# Patient Record
Sex: FEMALE | Race: WHITE | NOT HISPANIC OR LATINO | Employment: OTHER | URBAN - METROPOLITAN AREA
[De-identification: names, ages, dates, MRNs, and addresses within clinical notes are randomized per-mention and may not be internally consistent; named-entity substitution may affect disease eponyms.]

---

## 2019-07-09 ENCOUNTER — OFFICE VISIT (OUTPATIENT)
Dept: ORTHOPEDIC SURGERY | Facility: CLINIC | Age: 74
End: 2019-07-09

## 2019-07-09 VITALS
WEIGHT: 200 LBS | SYSTOLIC BLOOD PRESSURE: 139 MMHG | HEIGHT: 61 IN | BODY MASS INDEX: 37.76 KG/M2 | DIASTOLIC BLOOD PRESSURE: 83 MMHG | HEART RATE: 75 BPM

## 2019-07-09 DIAGNOSIS — R52 PAIN: Primary | ICD-10-CM

## 2019-07-09 DIAGNOSIS — M25.561 MECHANICAL KNEE PAIN, RIGHT: ICD-10-CM

## 2019-07-09 DIAGNOSIS — M84.361A STRESS FRACTURE OF RIGHT TIBIA, INITIAL ENCOUNTER: ICD-10-CM

## 2019-07-09 PROCEDURE — 99203 OFFICE O/P NEW LOW 30 MIN: CPT | Performed by: ORTHOPAEDIC SURGERY

## 2019-07-09 RX ORDER — ATORVASTATIN CALCIUM 20 MG/1
20 TABLET, FILM COATED ORAL NIGHTLY
COMMUNITY

## 2019-07-09 RX ORDER — GABAPENTIN 300 MG/1
300 CAPSULE ORAL 2 TIMES DAILY
Status: ON HOLD | COMMUNITY
End: 2020-12-31

## 2019-07-09 RX ORDER — BISOPROLOL FUMARATE 5 MG/1
5 TABLET, FILM COATED ORAL NIGHTLY
Refills: 3 | COMMUNITY
Start: 2019-04-09

## 2019-07-09 RX ORDER — GLIMEPIRIDE 4 MG/1
4 TABLET ORAL 2 TIMES DAILY
Refills: 3 | COMMUNITY
Start: 2019-06-27 | End: 2020-12-03

## 2019-07-09 RX ORDER — PHENAZOPYRIDINE HYDROCHLORIDE 100 MG/1
100 TABLET, FILM COATED ORAL 3 TIMES DAILY PRN
Refills: 0 | COMMUNITY
Start: 2019-05-02 | End: 2022-12-19

## 2019-07-09 RX ORDER — BUPROPION HYDROCHLORIDE 150 MG/1
150 TABLET ORAL DAILY PRN
Refills: 2 | COMMUNITY
Start: 2019-06-17 | End: 2020-12-03

## 2019-07-09 RX ORDER — LEVOTHYROXINE SODIUM 0.05 MG/1
50 TABLET ORAL EVERY MORNING
Refills: 2 | COMMUNITY
Start: 2019-06-20

## 2019-07-09 NOTE — PROGRESS NOTES
Subjective:     Patient ID: PATSY Harley is a 73 y.o. female.    Chief Complaint: right knee pain, new patient to provider    History of Present Illness  PATSY Harley presents to clinic today for evaluation of right knee. She state she fell on  of this year and fell onto her right knee. She was seen by her PCP who took x-rays and stated there were no fractures. Her pain continued to worsen and she went into the emergency room for further evaluation. She was given hydrocodone but she states she does not like them. She had a cortisone injection in her right knee approximately one month ago and she experienced approximately 50% relief of her pain but it only lasted about one week. Today, she rates the pain as 5/10, describes it as aching and shooting in nature.  Localizes pain to medial anterior aspects and the proximal leg. She notes intermittent popping sensations. Has noted improvement with activity modification tylenol.  Symptoms are exacerbated with prolonged weightbearing. Denies prior injury. She notes some intermittent radiation of her pain down her into her right lower leg. She denies associated numbness or tingling.       Social History     Occupational History   • Not on file   Tobacco Use   • Smoking status: Former Smoker     Last attempt to quit: 1980     Years since quittin.5   Substance and Sexual Activity   • Alcohol use: No     Frequency: Never   • Drug use: Not on file   • Sexual activity: Not on file      Past Medical History:   Diagnosis Date   • Anxiety    • Asthma    • Diabetes (CMS/HCC)    • Fibromyalgia    • Fracture, radius    • GERD (gastroesophageal reflux disease)    • Hyperthyroidism    • Sleep apnea      Past Surgical History:   Procedure Laterality Date   • APPENDECTOMY     • CARPAL TUNNEL RELEASE     • CERVICAL DISCECTOMY ANTERIOR     • HYSTERECTOMY     • MASTOIDECTOMY         Family History   Problem Relation Age of Onset   • Cancer Mother    • Cancer  "Father    • Diabetes Sister    • Cancer Sister          Review of Systems   Constitutional: Negative for chills, diaphoresis, fever and unexpected weight change.   HENT: Positive for tinnitus. Negative for hearing loss, nosebleeds and sore throat.    Eyes: Negative for pain and visual disturbance.   Respiratory: Positive for shortness of breath and wheezing. Negative for cough.    Cardiovascular: Negative for chest pain and palpitations.   Gastrointestinal: Negative for abdominal pain, diarrhea, nausea and vomiting.   Endocrine: Negative for cold intolerance, heat intolerance and polydipsia.   Genitourinary: Negative for difficulty urinating, dysuria and hematuria.   Musculoskeletal: Positive for arthralgias. Negative for joint swelling and myalgias.   Skin: Negative for rash and wound.   Allergic/Immunologic: Negative for environmental allergies.   Neurological: Negative for dizziness, syncope and numbness.   Hematological: Does not bruise/bleed easily.   Psychiatric/Behavioral: Negative for dysphoric mood and sleep disturbance. The patient is not nervous/anxious.    All other systems reviewed and are negative.          Objective:  Vitals:    07/09/19 1407   BP: 139/83   Pulse: 75   Weight: 90.7 kg (200 lb)   Height: 154.9 cm (61\")         07/09/19  1407   Weight: 90.7 kg (200 lb)     Body mass index is 37.79 kg/m².     General: No acute distress.  Resp: normal respiratory effort  Skin: no rashes or wounds; normal turgor  Psych: mood and affect appropriate; recent and remote memory intact      Ortho Exam       Right Knee-  ROM 0-95 degrees,   4/5 strength on flexion and extension  Stable to varus and valgus stress at 0 and 30  Positive APC  Maximal tenderness along medial joint line  Positive Ananth's exam  Negative log roll and Stinchfield's    Imaging:    Right Knee X-Ray from outside facility  Indication: Pain    AP, Lateral, and Doolittle views    Findings:  No fracture  No bony lesion  Normal soft " tissues  Normal joint spaces  Mild medial compartment joint space narrowing-these are non weightbearing views    No prior studies were available for comparison.    Standing AP bilateral knee x-ray from today's visit ordered and reviewed by me indicate moderate medial compartment joint space narrowing with bone on bone articulation. Overall varus alignment    Assessment:        1. Pain    2. Mechanical knee pain, right    3. Stress fracture of right tibia, initial encounter           Plan:          1. Discussed treatment options at length with patient at today's visit.   2. Ordered MRI today to evaluate for stress fracture or meniscal tear of the right knee.  3. Will follow-up with the results of the MRI      PATSY Harley was in agreement with plan and had all questions answered.     Orders:  Orders Placed This Encounter   Procedures   • XR Knee 1 or 2 View Bilateral   • MRI Knee Right Without Contrast       Medications:  No orders of the defined types were placed in this encounter.      Followup:  Return for review of MRI results.    PATSY was seen today for pain.    Diagnoses and all orders for this visit:    Pain  -     XR Knee 1 or 2 View Bilateral    Mechanical knee pain, right  -     MRI Knee Right Without Contrast; Future    Stress fracture of right tibia, initial encounter  -     MRI Knee Right Without Contrast; Future        By signing my name here, I Radha Garcia, attest that all documentation on 07/09/19 at 4:33 PM has been prepared under the direction and in the presence of Dr. Sj Hannah.    I, Dr. Sj Hannah, personally performed the services described in this documentation, as scribed by Radha Garcia, in my presence, and it is both accurate and complete.    Dictated utilizing Dragon dictation

## 2019-07-19 ENCOUNTER — HOSPITAL ENCOUNTER (OUTPATIENT)
Dept: MRI IMAGING | Facility: HOSPITAL | Age: 74
Discharge: HOME | End: 2019-07-19
Admitting: ORTHOPAEDIC SURGERY

## 2019-07-19 DIAGNOSIS — M25.561 MECHANICAL KNEE PAIN, RIGHT: ICD-10-CM

## 2019-07-19 DIAGNOSIS — M84.361A STRESS FRACTURE OF RIGHT TIBIA, INITIAL ENCOUNTER: ICD-10-CM

## 2019-07-19 PROCEDURE — 73721 MRI JNT OF LWR EXTRE W/O DYE: CPT

## 2019-07-22 ENCOUNTER — OFFICE VISIT (OUTPATIENT)
Dept: ORTHOPEDIC SURGERY | Facility: CLINIC | Age: 74
End: 2019-07-22

## 2019-07-22 DIAGNOSIS — S83.241A OTHER TEAR OF MEDIAL MENISCUS OF RIGHT KNEE AS CURRENT INJURY, INITIAL ENCOUNTER: ICD-10-CM

## 2019-07-22 DIAGNOSIS — M17.11 PRIMARY OSTEOARTHRITIS OF RIGHT KNEE: Primary | ICD-10-CM

## 2019-07-22 DIAGNOSIS — M84.361A STRESS FRACTURE OF RIGHT TIBIA, INITIAL ENCOUNTER: ICD-10-CM

## 2019-07-22 PROCEDURE — 99214 OFFICE O/P EST MOD 30 MIN: CPT | Performed by: ORTHOPAEDIC SURGERY

## 2019-07-22 PROCEDURE — 20610 DRAIN/INJ JOINT/BURSA W/O US: CPT | Performed by: ORTHOPAEDIC SURGERY

## 2019-07-22 RX ADMIN — TRIAMCINOLONE ACETONIDE 80 MG: 40 INJECTION, SUSPENSION INTRA-ARTICULAR; INTRAMUSCULAR at 15:09

## 2019-07-22 RX ADMIN — LIDOCAINE HYDROCHLORIDE 8 ML: 10 INJECTION, SOLUTION EPIDURAL; INFILTRATION; INTRACAUDAL; PERINEURAL at 15:09

## 2019-07-22 NOTE — PROGRESS NOTES
Subjective:     Patient ID: Jaz Harley is a 73 y.o. female.    Chief Complaint:  Follow-up right knee pain  History of Present Illness  Jaz Harley returns to clinic today for evaluation of right knee, patient states she is had minimal improvement over the last several weeks in regards to her pain, she is still having some catching sensation along the medial aspect of her knee, continues to rate pain is a 7 8 out of 10, aching in nature, occasional sharp pain particular with rotational activities, exacerbated with prolonged weightbearing and deep flexion activities.  Minimal improvement with rest and anti-inflammatory medications.  Swelling does wax and wane.  Denies radiation of pain, denies associated numbness or tingling.     Social History     Occupational History   • Not on file   Tobacco Use   • Smoking status: Former Smoker     Last attempt to quit: 1980     Years since quittin.5   Substance and Sexual Activity   • Alcohol use: No     Frequency: Never   • Drug use: Not on file   • Sexual activity: Not on file      Past Medical History:   Diagnosis Date   • Anxiety    • Asthma    • Diabetes (CMS/HCC)    • Fibromyalgia    • Fracture, radius    • GERD (gastroesophageal reflux disease)    • Hyperthyroidism    • Sleep apnea      Past Surgical History:   Procedure Laterality Date   • APPENDECTOMY     • CARPAL TUNNEL RELEASE     • CERVICAL DISCECTOMY ANTERIOR     • HYSTERECTOMY     • MASTOIDECTOMY         Family History   Problem Relation Age of Onset   • Cancer Mother    • Cancer Father    • Diabetes Sister    • Cancer Sister          Review of Systems   Constitutional: Negative for chills, diaphoresis, fever and unexpected weight change.   HENT: Negative for hearing loss, nosebleeds, sore throat and tinnitus.    Eyes: Negative for pain and visual disturbance.   Respiratory: Negative for cough, shortness of breath and wheezing.    Cardiovascular: Negative for chest pain and palpitations.    Gastrointestinal: Negative for abdominal pain, diarrhea, nausea and vomiting.   Endocrine: Negative for cold intolerance, heat intolerance and polydipsia.   Genitourinary: Negative for difficulty urinating, dysuria and hematuria.   Musculoskeletal: Positive for arthralgias and myalgias. Negative for joint swelling.   Skin: Negative for rash and wound.   Allergic/Immunologic: Negative for environmental allergies and immunocompromised state.   Neurological: Negative for dizziness, syncope and numbness.   Hematological: Does not bruise/bleed easily.   Psychiatric/Behavioral: Negative for dysphoric mood and sleep disturbance. The patient is not nervous/anxious.            Objective:  There were no vitals filed for this visit.  There were no vitals filed for this visit.  There is no height or weight on file to calculate BMI.  General: No acute distress.  Resp: normal respiratory effort  Skin: no rashes or wounds; normal turgor  Psych: mood and affect appropriate; recent and remote memory intact          Ortho Exam     Right knee-maximal tenderness palpation over medial proximal tibia as well as medial joint line with positive Ananth exam, range of motion 0 to 120 degrees, 4-5 strength in flexion and extension, mild effusion noted, stable to varus and valgus stress at 0 and 30 degrees.  Moderately positive active patellar compression test.  Grade 1A Lachman, negative anterior posterior drawer.    Imaging:  Mri Knee Right Without Contrast    Result Date: 7/19/2019  Impression: Degenerative changes in all 3 compartments with cartilage loss most prominent at the medial patellar facet and the medial compartment. There is subchondral edema in the medial tibial plateau with no loose osteochondral fragments. There is a horizontal undersurface tear in the posterior horn the medial meniscus. There is evidence of previous injury to the lateral collateral ligament at its femoral origin without acute tear. Signer Name: Bao  MD Maverick  Signed: 7/19/2019 1:24 PM  Workstation Name: [id number]     Review of outside MRI including review of images as well as radiology report indicates moderate degenerative change particularly the medial and patellofemoral compartments, there does appear to be chronic injury to proximal LCL complex at the lateral femoral epicondyle, degenerative undersurface tear of the medial meniscus with what appears to be an avulsion from the meniscal root with ghost sign on sagittal imaging on my review.    Assessment:        1. Primary osteoarthritis of right knee    2. Stress fracture of right tibia, initial encounter    3. medial meniscus root tear of right knee as current injury, initial encounter           Plan:  Large Joint Arthrocentesis: R knee  Date/Time: 7/22/2019 3:09 PM  Consent given by: patient  Site marked: site marked  Timeout: Immediately prior to procedure a time out was called to verify the correct patient, procedure, equipment, support staff and site/side marked as required   Supporting Documentation  Indications: pain   Procedure Details  Location: knee - R knee  Preparation: Patient was prepped and draped in the usual sterile fashion  Needle size: 22 G  Approach: superior (LATERAL)  Medications administered: 8 mL lidocaine PF 1% 1 %; 80 mg triamcinolone acetonide 40 MG/ML  Patient tolerance: patient tolerated the procedure well with no immediate complications              1. Discussed treatment options at length with patient at today's visit.  Reviewed option for total knee arthroplasty given her degenerative change as well as arthroscopic procedure with subchondroplasty of tibial plateau and arthroscopy with meniscectomy versus meniscal root repair.  Patient wants to avoid surgery at this point time.  Thus she wished to proceed with intra-articular injection and home exercise program.      Jaz Harley was in agreement with plan and had all questions answered.     Orders:  Orders Placed This  Encounter   Procedures   • Large Joint Arthrocentesis: R knee       Medications:  No orders of the defined types were placed in this encounter.      Followup:  No Follow-up on file.    Jaz was seen today for follow-up.    Diagnoses and all orders for this visit:    Primary osteoarthritis of right knee    Stress fracture of right tibia, initial encounter    medial meniscus root tear of right knee as current injury, initial encounter    Other orders  -     Large Joint Arthrocentesis: R knee          Dictated utilizing Dragon dictation

## 2019-07-23 PROBLEM — S83.241A TEAR OF MEDIAL MENISCUS OF RIGHT KNEE, CURRENT: Status: ACTIVE | Noted: 2019-07-23

## 2019-07-23 PROBLEM — M84.361A STRESS FRACTURE OF RIGHT TIBIA: Status: ACTIVE | Noted: 2019-07-23

## 2019-07-23 PROBLEM — M17.11 PRIMARY OSTEOARTHRITIS OF RIGHT KNEE: Status: ACTIVE | Noted: 2019-07-23

## 2019-07-23 RX ORDER — LIDOCAINE HYDROCHLORIDE 10 MG/ML
8 INJECTION, SOLUTION EPIDURAL; INFILTRATION; INTRACAUDAL; PERINEURAL
Status: COMPLETED | OUTPATIENT
Start: 2019-07-22 | End: 2019-07-22

## 2019-07-23 RX ORDER — TRIAMCINOLONE ACETONIDE 40 MG/ML
80 INJECTION, SUSPENSION INTRA-ARTICULAR; INTRAMUSCULAR
Status: COMPLETED | OUTPATIENT
Start: 2019-07-22 | End: 2019-07-22

## 2019-09-03 ENCOUNTER — OFFICE VISIT (OUTPATIENT)
Dept: ORTHOPEDIC SURGERY | Facility: CLINIC | Age: 74
End: 2019-09-03

## 2019-09-03 DIAGNOSIS — M84.361A STRESS FRACTURE OF RIGHT TIBIA, INITIAL ENCOUNTER: ICD-10-CM

## 2019-09-03 DIAGNOSIS — S83.241A OTHER TEAR OF MEDIAL MENISCUS OF RIGHT KNEE AS CURRENT INJURY, INITIAL ENCOUNTER: ICD-10-CM

## 2019-09-03 DIAGNOSIS — M17.11 PRIMARY OSTEOARTHRITIS OF RIGHT KNEE: Primary | ICD-10-CM

## 2019-09-03 PROCEDURE — 20610 DRAIN/INJ JOINT/BURSA W/O US: CPT | Performed by: ORTHOPAEDIC SURGERY

## 2019-09-03 PROCEDURE — 99213 OFFICE O/P EST LOW 20 MIN: CPT | Performed by: ORTHOPAEDIC SURGERY

## 2019-09-03 RX ORDER — SULFAMETHOXAZOLE AND TRIMETHOPRIM 800; 160 MG/1; MG/1
1 TABLET ORAL 2 TIMES DAILY
Refills: 0 | COMMUNITY
Start: 2019-08-21 | End: 2020-12-03

## 2019-09-03 NOTE — PROGRESS NOTES
Subjective:     Patient ID: Jaz Harley is a 73 y.o. female.    Chief Complaint: follow-up right knee pain    History of Present Illness  Jaz Harley returns to clinic today for evaluation of her right knee.   The patient had prior cortisone injection to the right knee on 19. She notes approximately 50% improvement of the pain with the injection, which is still lasting. She returns today with pain localized medially when she steps a certain way. She rates this intermittent pain as an 8/10 and describes it as dull aching with occasional sharp pains with certain movements. She has also been experiencing popping sensations and episodes of instability.  Minimal improvement with activity modification, denies any radiation of pain into her lower leg but does still note some residual mild radiation to the medial proximal tibia.  Denies associated numbness or tingling.           Social History     Occupational History   • Not on file   Tobacco Use   • Smoking status: Former Smoker     Last attempt to quit: 1980     Years since quittin.6   Substance and Sexual Activity   • Alcohol use: No     Frequency: Never   • Drug use: Not on file   • Sexual activity: Not on file      Past Medical History:   Diagnosis Date   • Anxiety    • Asthma    • Diabetes (CMS/HCC)    • Fibromyalgia    • Fracture, radius    • GERD (gastroesophageal reflux disease)    • Hyperthyroidism    • Sleep apnea      Past Surgical History:   Procedure Laterality Date   • APPENDECTOMY     • CARPAL TUNNEL RELEASE     • CERVICAL DISCECTOMY ANTERIOR     • HYSTERECTOMY     • MASTOIDECTOMY         Family History   Problem Relation Age of Onset   • Cancer Mother    • Cancer Father    • Diabetes Sister    • Cancer Sister          Review of Systems   Constitutional: Negative for chills, diaphoresis, fever and unexpected weight change.   HENT: Negative for hearing loss, nosebleeds, sore throat and tinnitus.    Eyes: Negative for pain and  visual disturbance.   Respiratory: Negative for cough, shortness of breath and wheezing.    Cardiovascular: Negative for chest pain and palpitations.   Gastrointestinal: Negative for abdominal pain, diarrhea, nausea and vomiting.   Endocrine: Negative for cold intolerance, heat intolerance and polydipsia.   Genitourinary: Negative for difficulty urinating, dysuria and hematuria.   Musculoskeletal: Positive for arthralgias. Negative for joint swelling and myalgias.   Skin: Negative for rash and wound.   Allergic/Immunologic: Negative for environmental allergies.   Neurological: Negative for dizziness, syncope and numbness.   Hematological: Does not bruise/bleed easily.   Psychiatric/Behavioral: Negative for dysphoric mood and sleep disturbance. The patient is not nervous/anxious.            Objective:  There were no vitals filed for this visit.  There were no vitals filed for this visit.  There is no height or weight on file to calculate BMI.    General: No acute distress.  Resp: normal respiratory effort  Skin: no rashes or wounds; normal turgor  Psych: mood and affect appropriate; recent and remote memory intact      Ortho Exam     Right Knee-  ROM 0-110 degrees  Maximal tenderness medial joint line  Positive Ananth exam  Positive APC  Moderate tenderness medial proximal tibia   Stable to varus and valgus stress at 0 and 30 degrees   Positive sensation right foot     Imaging:  None    Assessment:        1. Primary osteoarthritis of right knee    2. Stress fracture of right tibia, initial encounter    3. medial meniscus root tear of right knee as current injury, initial encounter           Plan:  Large Joint Arthrocentesis: R knee  Date/Time: 9/3/2019 10:28 AM  Consent given by: patient  Site marked: site marked  Timeout: Immediately prior to procedure a time out was called to verify the correct patient, procedure, equipment, support staff and site/side marked as required   Supporting Documentation  Indications:  pain   Procedure Details  Location: knee - R knee  Preparation: Patient was prepped and draped in the usual sterile fashion  Needle size: 22 G  Approach: superior (LATERAL)  Medications administered: 30 mg Hyaluronan 30 MG/2ML  Patient tolerance: patient tolerated the procedure well with no immediate complications                1. Discussed treatment options at length with patient at today's visit.   2. Patient would like to proceed with gel injections to the right knee starting today given incomplete relief with cortisone injection.  3. Patient given Dry-Dmitry hinged knee brace for stabilization of instability symptoms as well as her advanced DJD of medial and patellofemoral compartments.      Jaz Harley was in agreement with plan and had all questions answered.     Orders:  Orders Placed This Encounter   Procedures   • Large Joint Arthrocentesis: R knee   • Visco Treatment       Medications:  No orders of the defined types were placed in this encounter.      Followup:  Return in about 1 week (around 9/10/2019).    Jaz was seen today for follow-up and pain.    Diagnoses and all orders for this visit:    Primary osteoarthritis of right knee  -     Visco Treatment; Future    Stress fracture of right tibia, initial encounter    medial meniscus root tear of right knee as current injury, initial encounter    Other orders  -     Large Joint Arthrocentesis: R knee        By signing my name here, I Radha Garcia, attest that all documentation on 09/03/19 at 10:53 AM has been prepared under the direction and in the presence of Dr. Sj Hannah.    I, Dr. Sj Hannah, personally performed the services described in this documentation, as scribed by Radha Garcia, in my presence, and it is both accurate and complete.    Dictated utilizing Dragon dictation

## 2019-09-12 ENCOUNTER — OFFICE VISIT (OUTPATIENT)
Dept: ORTHOPEDIC SURGERY | Facility: CLINIC | Age: 74
End: 2019-09-12

## 2019-09-12 VITALS — BODY MASS INDEX: 37.76 KG/M2 | HEIGHT: 61 IN | WEIGHT: 200 LBS

## 2019-09-12 DIAGNOSIS — R52 PAIN: ICD-10-CM

## 2019-09-12 DIAGNOSIS — M17.11 PRIMARY OSTEOARTHRITIS OF RIGHT KNEE: Primary | ICD-10-CM

## 2019-09-12 PROCEDURE — 20610 DRAIN/INJ JOINT/BURSA W/O US: CPT | Performed by: ORTHOPAEDIC SURGERY

## 2019-09-12 NOTE — PROGRESS NOTES
Large Joint Arthrocentesis: R knee  Date/Time: 9/12/2019 9:36 AM  Consent given by: patient  Site marked: site marked  Timeout: Immediately prior to procedure a time out was called to verify the correct patient, procedure, equipment, support staff and site/side marked as required   Supporting Documentation  Indications: pain   Procedure Details  Location: knee - R knee  Preparation: Patient was prepped and draped in the usual sterile fashion  Needle size: 22 G  Approach: anterolateral  Medications administered: 30 mg Hyaluronan 30 MG/2ML  Patient tolerance: patient tolerated the procedure well with no immediate complications        Patient presents to clinic today for right knee viscosupplement injections.  This is the 2nd injection of the series.  I explained details of injections as well as risks, benefits and alternatives with the patient today, had all questions answered, wished to proceed with injections.  I will see patient back in 1 week for repeat injection.  Patient was instructed to watch for signs or symptoms of infection including redness, swelling, warmth to the touch, or significant increased pain and to contact our office immediately if any of these issues were noted.

## 2019-09-12 NOTE — PROGRESS NOTES
Subjective:     Patient ID: Jaz Harley is a 73 y.o. female.    Chief Complaint: follow-up right knee pain, DJD, stress fracture of right tibia    History of Present Illness  Jaz Harley     Social History     Occupational History   • Not on file   Tobacco Use   • Smoking status: Former Smoker     Last attempt to quit: 1980     Years since quittin.7   Substance and Sexual Activity   • Alcohol use: No     Frequency: Never   • Drug use: Not on file   • Sexual activity: Not on file      Past Medical History:   Diagnosis Date   • Anxiety    • Asthma    • Diabetes (CMS/HCC)    • Fibromyalgia    • Fracture, radius    • GERD (gastroesophageal reflux disease)    • Hyperthyroidism    • Sleep apnea      Past Surgical History:   Procedure Laterality Date   • APPENDECTOMY     • CARPAL TUNNEL RELEASE     • CERVICAL DISCECTOMY ANTERIOR     • HYSTERECTOMY     • MASTOIDECTOMY         Family History   Problem Relation Age of Onset   • Cancer Mother    • Cancer Father    • Diabetes Sister    • Cancer Sister          Review of Systems   Constitutional: Negative for chills, diaphoresis, fever and unexpected weight change.   HENT: Negative for hearing loss, nosebleeds, sore throat and tinnitus.    Eyes: Negative for pain and visual disturbance.   Respiratory: Negative for cough, shortness of breath and wheezing.    Cardiovascular: Negative for chest pain and palpitations.   Gastrointestinal: Negative for abdominal pain, diarrhea, nausea and vomiting.   Endocrine: Negative for cold intolerance, heat intolerance and polydipsia.   Genitourinary: Negative for difficulty urinating, dysuria and hematuria.   Musculoskeletal: Positive for arthralgias and myalgias. Negative for joint swelling.   Skin: Negative for rash and wound.   Allergic/Immunologic: Negative for environmental allergies.   Neurological: Negative for dizziness, syncope and numbness.   Hematological: Does not bruise/bleed easily.  "  Psychiatric/Behavioral: Negative for dysphoric mood and sleep disturbance. The patient is not nervous/anxious.            Objective:  Vitals:    09/12/19 0847   Weight: 90.7 kg (200 lb)   Height: 154.9 cm (61\")         09/12/19  0847   Weight: 90.7 kg (200 lb)     Body mass index is 37.79 kg/m².    General: No acute distress.  Resp: normal respiratory effort  Skin: no rashes or wounds; normal turgor  Psych: mood and affect appropriate; recent and remote memory intact      Ortho Exam     ***  Imaging:  ***  Assessment:      No diagnosis found.       Plan:          1. Discussed treatment options at length with patient at today's visit. ***      Jaz Harley was in agreement with plan and had all questions answered.     Orders:  No orders of the defined types were placed in this encounter.      Medications:  No orders of the defined types were placed in this encounter.      Followup:  No Follow-up on file.    There are no diagnoses linked to this encounter.    By signing my name here, I Radha Garcia, attest that all documentation on 09/12/19 at 9:12 AM has been prepared under the direction and in the presence of Dr. Sj Hannah.    I, Dr. Sj Hannah, personally performed the services described in this documentation, as scribed by Radha Garcia, in my presence, and it is both accurate and complete.    Dictated utilizing Dragon dictation   "

## 2019-09-24 ENCOUNTER — CLINICAL SUPPORT (OUTPATIENT)
Dept: ORTHOPEDIC SURGERY | Facility: CLINIC | Age: 74
End: 2019-09-24

## 2019-09-24 DIAGNOSIS — M17.11 PRIMARY OSTEOARTHRITIS OF RIGHT KNEE: Primary | ICD-10-CM

## 2019-09-24 PROCEDURE — 20610 DRAIN/INJ JOINT/BURSA W/O US: CPT | Performed by: ORTHOPAEDIC SURGERY

## 2019-09-24 NOTE — PROGRESS NOTES
Large Joint Arthrocentesis: R knee  Date/Time: 9/24/2019 9:27 AM  Consent given by: patient  Site marked: site marked  Timeout: Immediately prior to procedure a time out was called to verify the correct patient, procedure, equipment, support staff and site/side marked as required   Supporting Documentation  Indications: pain   Procedure Details  Location: knee - R knee  Preparation: Patient was prepped and draped in the usual sterile fashion  Needle size: 22 G  Approach: anterolateral  Medications administered: 30 mg Hyaluronan 30 MG/2ML  Patient tolerance: patient tolerated the procedure well with no immediate complications        Patient presents to clinic today for right knee viscosupplement injections.  This is the 3rd injection of the series.  I explained details of injections as well as risks, benefits and alternatives with the patient today, had all questions answered, wished to proceed with injections.  I will see patient back in 6 weeks for re-evaluation. Patient was instructed to watch for signs or symptoms of infection including redness, swelling, warmth to the touch, or significant increased pain and to contact our office immediately if any of these issues were noted.

## 2019-11-05 ENCOUNTER — OFFICE VISIT (OUTPATIENT)
Dept: ORTHOPEDIC SURGERY | Facility: CLINIC | Age: 74
End: 2019-11-05

## 2019-11-05 VITALS — BODY MASS INDEX: 37.38 KG/M2 | WEIGHT: 198 LBS | HEIGHT: 61 IN

## 2019-11-05 DIAGNOSIS — M17.11 PRIMARY OSTEOARTHRITIS OF RIGHT KNEE: Primary | ICD-10-CM

## 2019-11-05 PROCEDURE — 99212 OFFICE O/P EST SF 10 MIN: CPT | Performed by: ORTHOPAEDIC SURGERY

## 2019-11-05 NOTE — PROGRESS NOTES
Subjective:     Patient ID: Jaz Harley is a 73 y.o. female.    Chief Complaint: follow-up right knee pain    History of Present Illness  Jaz Harley returns to clinic today for evaluation of her right knee. The patient had prior gel supplementation injection series to the right knee ending on 19. She notes approximately 75% improvement of the pain with the injection, which is still lasting.  Today, she rates the pain as 6-7/10, describes it as aching in nature. Localizes pain to medially.    Symptoms are exacerbated with weightbearing, walking and her pain is worse at nighttime. Denies radiation of pain, denies associated numbness or tingling.    Social History     Occupational History   • Not on file   Tobacco Use   • Smoking status: Former Smoker     Last attempt to quit: 1980     Years since quittin.8   Substance and Sexual Activity   • Alcohol use: No     Frequency: Never   • Drug use: Not on file   • Sexual activity: Not on file      Past Medical History:   Diagnosis Date   • Anxiety    • Asthma    • Diabetes (CMS/HCC)    • Fibromyalgia    • Fracture, radius    • GERD (gastroesophageal reflux disease)    • Hyperthyroidism    • Sleep apnea      Past Surgical History:   Procedure Laterality Date   • APPENDECTOMY     • CARPAL TUNNEL RELEASE     • CERVICAL DISCECTOMY ANTERIOR     • HYSTERECTOMY     • MASTOIDECTOMY         Family History   Problem Relation Age of Onset   • Cancer Mother    • Cancer Father    • Diabetes Sister    • Cancer Sister          Review of Systems   Constitutional: Negative for chills, diaphoresis, fever and unexpected weight change.   HENT: Negative for hearing loss, nosebleeds, sore throat and tinnitus.    Eyes: Negative for pain and visual disturbance.   Respiratory: Negative for cough, shortness of breath and wheezing.    Cardiovascular: Negative for chest pain and palpitations.   Gastrointestinal: Negative for abdominal pain, diarrhea, nausea and vomiting.  "  Endocrine: Negative for cold intolerance, heat intolerance and polydipsia.   Genitourinary: Negative for difficulty urinating, dysuria and hematuria.   Musculoskeletal: Positive for arthralgias and myalgias. Negative for joint swelling.   Skin: Negative for rash and wound.   Allergic/Immunologic: Negative for environmental allergies.   Neurological: Negative for dizziness, syncope and numbness.   Hematological: Does not bruise/bleed easily.   Psychiatric/Behavioral: Negative for dysphoric mood and sleep disturbance. The patient is not nervous/anxious.            Objective:  Vitals:    11/05/19 0800   Weight: 89.8 kg (198 lb)   Height: 154.9 cm (61\")         11/05/19  0800   Weight: 89.8 kg (198 lb)     Body mass index is 37.41 kg/m².    General: No acute distress.  Resp: normal respiratory effort  Skin: no rashes or wounds; normal turgor  Psych: mood and affect appropriate; recent and remote memory intact      Ortho Exam       Right Knee-    ROM 0-125 degrees  Maximal tenderness medial joint line   Effusion- minimal   No opening on varus and valgus stress at 0 and 30  Log roll-  negative  Stinchfield-  negative  Positive sensation light tough all distributions symmetric to contralateral side  Brisk cap refill all digits  2+ dorsalis pedis pulse    Imaging:  None  Assessment:        1. Primary osteoarthritis of right knee           Plan:          1. Discussed treatment options at length with patient at today's visit.   2. If her pain returns, we may repeat the gel injections in 6 months . If her pain returns in less than 6 months, we may consider a cortisone injection. Patient would like to see the relief she experiences from the injections before considering the right total knee arthoplasty.       Jaz Harley was in agreement with plan and had all questions answered.     Orders:  No orders of the defined types were placed in this encounter.      Medications:  No orders of the defined types were placed in " this encounter.      Followup:  Return if symptoms worsen or fail to improve.    Jaz was seen today for follow-up.    Diagnoses and all orders for this visit:    Primary osteoarthritis of right knee        By signing my name here, I Radha Garcia, attest that all documentation on 11/05/19 at 8:34 AM has been prepared under the direction and in the presence of Dr. Sj Hannah.    I, Dr. Sj Hannah, personally performed the services described in this documentation, as scribed by Radha Garcia, in my presence, and it is both accurate and complete.      Dictated utilizing Dragon dictation

## 2020-12-03 ENCOUNTER — OFFICE VISIT (OUTPATIENT)
Dept: ORTHOPEDIC SURGERY | Facility: CLINIC | Age: 75
End: 2020-12-03

## 2020-12-03 VITALS — BODY MASS INDEX: 36.82 KG/M2 | WEIGHT: 195 LBS | HEIGHT: 61 IN

## 2020-12-03 DIAGNOSIS — R52 PAIN: ICD-10-CM

## 2020-12-03 DIAGNOSIS — M17.11 PRIMARY OSTEOARTHRITIS OF RIGHT KNEE: Primary | ICD-10-CM

## 2020-12-03 PROBLEM — E66.9 OBESITY: Status: ACTIVE | Noted: 2020-12-03

## 2020-12-03 PROBLEM — I07.1 TRICUSPID REGURGITATION: Status: ACTIVE | Noted: 2020-12-03

## 2020-12-03 PROBLEM — E78.5 HYPERLIPIDEMIA: Status: ACTIVE | Noted: 2020-12-03

## 2020-12-03 PROBLEM — I10 BENIGN ESSENTIAL HTN: Status: ACTIVE | Noted: 2020-12-03

## 2020-12-03 PROBLEM — E07.9 THYROID DISEASE: Status: ACTIVE | Noted: 2020-12-03

## 2020-12-03 PROBLEM — G47.33 OBSTRUCTIVE SLEEP APNEA: Status: ACTIVE | Noted: 2020-12-03

## 2020-12-03 PROBLEM — H91.92 HEARING LOSS IN LEFT EAR: Status: ACTIVE | Noted: 2020-12-03

## 2020-12-03 PROBLEM — K21.9 GERD (GASTROESOPHAGEAL REFLUX DISEASE): Status: ACTIVE | Noted: 2020-12-03

## 2020-12-03 PROBLEM — M79.7 FIBROMYALGIA: Status: ACTIVE | Noted: 2020-12-03

## 2020-12-03 PROBLEM — I35.8 AORTIC VALVE SCLEROSIS: Status: ACTIVE | Noted: 2020-12-03

## 2020-12-03 PROCEDURE — 99214 OFFICE O/P EST MOD 30 MIN: CPT | Performed by: ORTHOPAEDIC SURGERY

## 2020-12-03 PROCEDURE — 73562 X-RAY EXAM OF KNEE 3: CPT | Performed by: ORTHOPAEDIC SURGERY

## 2020-12-03 RX ORDER — ALBUTEROL SULFATE 90 UG/1
2 AEROSOL, METERED RESPIRATORY (INHALATION)
COMMUNITY

## 2020-12-03 RX ORDER — FEXOFENADINE HCL 180 MG/1
180 TABLET ORAL DAILY PRN
COMMUNITY

## 2020-12-03 RX ORDER — SULFACETAMIDE SODIUM 100 MG/ML
1 SOLUTION/ DROPS OPHTHALMIC DAILY PRN
COMMUNITY
Start: 2020-10-06

## 2020-12-03 RX ORDER — NITROGLYCERIN 0.4 MG/1
0.4 TABLET SUBLINGUAL
COMMUNITY

## 2020-12-03 RX ORDER — FUROSEMIDE 40 MG/1
40 TABLET ORAL DAILY PRN
Status: ON HOLD | COMMUNITY
End: 2020-12-31 | Stop reason: SDDI

## 2020-12-03 RX ORDER — GLYBURIDE 5 MG/1
5 TABLET ORAL 2 TIMES DAILY
COMMUNITY

## 2020-12-03 RX ORDER — OMEPRAZOLE 40 MG/1
20 CAPSULE, DELAYED RELEASE ORAL EVERY MORNING
COMMUNITY

## 2020-12-03 NOTE — PROGRESS NOTES
Subjective:     Patient ID: Jaz Harley is a 75 y.o. female.    Chief Complaint: right knee pain, new exacerbation  Last injection right knee visco 2019    History of Present Illness  Jaz Harley returns to clinic today for evaluation of her right knee. She complains of recurrent pain in the right knee today, denies any new injury. She rates the pain as 10/10, describes it as aching in nature, with occasional sharp pain. Localizes pain to the medial aspect of the knee. Has noted improvement with rest, mild improvement with tylenol. Noted improvement lasting about 6 months with last viscosupplement injection. Symptoms are exacerbated with weightbearing. Denies radiation of pain, denies associated numbness or tingling.       Social History     Occupational History   • Not on file   Tobacco Use   • Smoking status: Former Smoker     Quit date:      Years since quittin.9   • Smokeless tobacco: Never Used   Substance and Sexual Activity   • Alcohol use: No     Frequency: Never   • Drug use: Never   • Sexual activity: Defer      Past Medical History:   Diagnosis Date   • Anxiety    • Asthma    • Diabetes (CMS/HCC)    • Fibromyalgia    • Fracture, radius    • GERD (gastroesophageal reflux disease)    • Hyperthyroidism    • Sleep apnea      Past Surgical History:   Procedure Laterality Date   • APPENDECTOMY     • CARPAL TUNNEL RELEASE     • CERVICAL DISCECTOMY ANTERIOR     • HYSTERECTOMY     • MASTOIDECTOMY         Family History   Problem Relation Age of Onset   • Cancer Mother    • Cancer Father    • Diabetes Sister    • Cancer Sister          Review of Systems   Constitutional: Negative for chills, diaphoresis and unexpected weight change.   HENT: Negative for hearing loss, nosebleeds, sore throat and tinnitus.    Eyes: Negative for pain and visual disturbance.   Respiratory: Negative for cough, shortness of breath and wheezing.    Cardiovascular: Negative for chest pain and  "palpitations.   Gastrointestinal: Negative for abdominal pain, diarrhea, nausea and vomiting.   Endocrine: Negative for cold intolerance, heat intolerance and polydipsia.   Genitourinary: Negative for difficulty urinating, dyspareunia and hematuria.   Musculoskeletal: Negative for gait problem and joint swelling.   Skin: Negative for rash and wound.   Allergic/Immunologic: Negative for environmental allergies.   Neurological: Negative for dizziness, syncope and numbness.   Hematological: Does not bruise/bleed easily.   Psychiatric/Behavioral: Negative for dysphoric mood and sleep disturbance. The patient is not nervous/anxious.            Objective:  Vitals:    12/03/20 0912   Weight: 88.5 kg (195 lb)   Height: 154.9 cm (61\")         12/03/20 0912   Weight: 88.5 kg (195 lb)     Body mass index is 36.84 kg/m².    Physical Exam    Vital signs reviewed.   General: No acute distress, alert and oriented  Eyes: conjunctiva clear; pupils equally round and reactive  ENT: external ears and nose atraumatic; oropharynx clear  CV: no peripheral edema  Resp: normal respiratory effort  Skin: no rashes or wounds; normal turgor  Psych: mood and affect appropriate; recent and remote memory intact        Ortho Exam       Right Knee-    ROM 0-120 degrees  4/5 on flexion  4/5 on extension  Maximal tenderness medial joint line    Effusion- moderate   Grade 1A [place]  No opening on varus and valgus stress at 0 and 30  Active patellar compression test- positive     Log roll-  negative  Stinchfield-  negative    Positive sensation light tough all distributions symmetric to contralateral side  Brisk cap refill all digits  1+ dorsalis pedis pulse      Imaging:  Right Knee X-Ray  Indication: Pain    AP, Lateral, and [place] views    Findings:  No fracture  No bony lesion  Normal soft tissues  Advanced recommend osteoarthritis with bone-on-bone articulation medial compartment and overall varus alignment noted, reactive osteophyte formation " along medial proximal tibia  Compared to prior office x-rays    Assessment:        1. Primary osteoarthritis of right knee    2. Pain           Plan:          1. Discussed treatment options at length with patient at today's visit including cortisone injection vs viscosupplement injection vs total knee arthroplasty. Patient would like to proceed with right total knee arthroplasty at this time.   2. I reviewed anatomy and a model of a total knee arthroplasty, as well as typical postoperative recovery of 6-12 months before maxia recovery, and possible need for rehabilitation stay after hospitalization. We also discussed risks, benefits, and alternatives of procedure with risks including but not limited to neurovascular damage, bleeding, infection, malalignment, chronic pian, failure of implants, osteolysis, loosening of implants, loss of motion, weakness, stiffness, instability, DVT, pulmonary embolus, death, stroke, complex regional pain syndrome, myocardial infarction, and need for additional procedures. Patient understood all these and had all questions answered before consenting to the procedure. No guarantees were given in regards to results from the surgery. We will have patient medically optimized by their primary care physician and plan on proceeding with surgery at next available date.   3. Patient denies past medical history of blood clots or cardiac issues. Notes a history of diabetes mellitus, and her last hemoglobin A1C was 6.1 per her report.         Jazjemima Spencer Cricket was in agreement with plan and had all questions answered.     Orders:  Orders Placed This Encounter   Procedures   • XR Knee 3+ View With [place] Right   • Basic metabolic panel   • Protime-INR   • APTT   • Hemoglobin A1c   • Urinalysis With Culture If Indicated -   • Follow anesthesia standing orders.   • Provide instructions to patient regarding NPO status   • Care Order / Instruction for all Female Patients   • Provide NPO  Instructions to Patient   • ECG 12 Lead   • CBC and Differential       Medications:  No orders of the defined types were placed in this encounter.      Followup:  No follow-ups on file.    Diagnoses and all orders for this visit:    1. Primary osteoarthritis of right knee (Primary)  -     XR Knee 3+ View With [place] Right  -     Case Request; Standing  -     CBC and Differential; Future  -     Basic metabolic panel; Future  -     Protime-INR; Future  -     APTT; Future  -     Hemoglobin A1c; Future  -     Urinalysis With Culture If Indicated -; Future  -     ECG 12 Lead; Future  -     acetaminophen (TYLENOL) tablet 975 mg  -     meloxicam (MOBIC) tablet 15 mg  -     pregabalin (LYRICA) capsule 150 mg  -     Case Request    2. Pain  -     XR Knee 3+ View With [place] Right    Other orders  -     Initiate Observation Status; Standing  -     Follow anesthesia standing orders.  -     Provide instructions to patient regarding NPO status  -     Care Order / Instruction for all Female Patients  -     Follow anesthesia standing orders.; Standing  -     Verify NPO Status; Standing  -     SCD (sequential compression device)- to be placed on patient in Pre-op; Standing  -     Clip operative site; Standing  -     Obtain informed consent (if not collected inpatient or PAT); Standing  -     Type & Screen; Standing  -     Provide NPO Instructions to Patient  -     Inpatient Consult to Hospitalist; Standing           By signing my name here, I Cornelia Alanis attest that all documentation on 12/04/20 at 12:18 EST has been prepared under the direction and in the presence of Dr. Sj Hannah MD.    I, Dr. Sj Hannah, personally performed the services described in this documentation, as scribed by Cornelia Alanis, in my presence, and it is both accurate and complete.        Dictated utilizing Dragon dictation

## 2020-12-04 RX ORDER — MELOXICAM 7.5 MG/1
15 TABLET ORAL ONCE
Status: CANCELLED | OUTPATIENT
Start: 2020-12-04 | End: 2020-12-04

## 2020-12-04 RX ORDER — PREGABALIN 150 MG/1
150 CAPSULE ORAL ONCE
Status: CANCELLED | OUTPATIENT
Start: 2020-12-04 | End: 2020-12-04

## 2020-12-04 RX ORDER — ACETAMINOPHEN 325 MG/1
1000 TABLET ORAL ONCE
Status: CANCELLED | OUTPATIENT
Start: 2020-12-04 | End: 2020-12-04

## 2020-12-07 ENCOUNTER — TRANSCRIBE ORDERS (OUTPATIENT)
Dept: ADMINISTRATIVE | Facility: HOSPITAL | Age: 75
End: 2020-12-07

## 2020-12-07 DIAGNOSIS — Z01.818 PREOP EXAMINATION: Primary | ICD-10-CM

## 2020-12-15 ENCOUNTER — APPOINTMENT (OUTPATIENT)
Dept: PREADMISSION TESTING | Facility: HOSPITAL | Age: 75
End: 2020-12-15

## 2020-12-15 ENCOUNTER — PREP FOR SURGERY (OUTPATIENT)
Dept: OTHER | Facility: HOSPITAL | Age: 75
End: 2020-12-15

## 2020-12-15 VITALS — BODY MASS INDEX: 38.1 KG/M2 | WEIGHT: 201.8 LBS | HEIGHT: 61 IN

## 2020-12-15 DIAGNOSIS — M17.11 PRIMARY OSTEOARTHRITIS OF RIGHT KNEE: ICD-10-CM

## 2020-12-15 DIAGNOSIS — M17.11 PRIMARY OSTEOARTHRITIS OF RIGHT KNEE: Primary | ICD-10-CM

## 2020-12-15 LAB
ANION GAP SERPL CALCULATED.3IONS-SCNC: 11.2 MMOL/L (ref 5–15)
APTT PPP: 28.8 SECONDS (ref 24.3–38.1)
BACTERIA UR QL AUTO: ABNORMAL /HPF
BASOPHILS # BLD AUTO: 0.03 10*3/MM3 (ref 0–0.2)
BASOPHILS NFR BLD AUTO: 0.4 % (ref 0–1.5)
BILIRUB UR QL STRIP: NEGATIVE
BUN SERPL-MCNC: 16 MG/DL (ref 8–23)
BUN/CREAT SERPL: 15 (ref 7–25)
CALCIUM SPEC-SCNC: 8.9 MG/DL (ref 8.6–10.5)
CHLORIDE SERPL-SCNC: 104 MMOL/L (ref 98–107)
CLARITY UR: ABNORMAL
CO2 SERPL-SCNC: 23.8 MMOL/L (ref 22–29)
COLOR UR: YELLOW
CREAT SERPL-MCNC: 1.07 MG/DL (ref 0.57–1)
DEPRECATED RDW RBC AUTO: 43.1 FL (ref 37–54)
EOSINOPHIL # BLD AUTO: 0.2 10*3/MM3 (ref 0–0.4)
EOSINOPHIL NFR BLD AUTO: 2.8 % (ref 0.3–6.2)
ERYTHROCYTE [DISTWIDTH] IN BLOOD BY AUTOMATED COUNT: 14 % (ref 12.3–15.4)
GFR SERPL CREATININE-BSD FRML MDRD: 50 ML/MIN/1.73
GLUCOSE SERPL-MCNC: 111 MG/DL (ref 65–99)
GLUCOSE UR STRIP-MCNC: NEGATIVE MG/DL
HBA1C MFR BLD: 7.2 % (ref 4.8–5.6)
HCT VFR BLD AUTO: 41.1 % (ref 34–46.6)
HGB BLD-MCNC: 13.1 G/DL (ref 12–15.9)
HGB UR QL STRIP.AUTO: NEGATIVE
HYALINE CASTS UR QL AUTO: ABNORMAL /LPF
IMM GRANULOCYTES # BLD AUTO: 0.04 10*3/MM3 (ref 0–0.05)
IMM GRANULOCYTES NFR BLD AUTO: 0.6 % (ref 0–0.5)
INR PPP: 1.03 (ref 0.9–1.1)
KETONES UR QL STRIP: NEGATIVE
LEUKOCYTE ESTERASE UR QL STRIP.AUTO: ABNORMAL
LYMPHOCYTES # BLD AUTO: 1.97 10*3/MM3 (ref 0.7–3.1)
LYMPHOCYTES NFR BLD AUTO: 27.9 % (ref 19.6–45.3)
MCH RBC QN AUTO: 27 PG (ref 26.6–33)
MCHC RBC AUTO-ENTMCNC: 31.9 G/DL (ref 31.5–35.7)
MCV RBC AUTO: 84.6 FL (ref 79–97)
MONOCYTES # BLD AUTO: 0.52 10*3/MM3 (ref 0.1–0.9)
MONOCYTES NFR BLD AUTO: 7.4 % (ref 5–12)
NEUTROPHILS NFR BLD AUTO: 4.31 10*3/MM3 (ref 1.7–7)
NEUTROPHILS NFR BLD AUTO: 60.9 % (ref 42.7–76)
NITRITE UR QL STRIP: POSITIVE
PH UR STRIP.AUTO: <=5 [PH] (ref 4.5–8)
PLATELET # BLD AUTO: 277 10*3/MM3 (ref 140–450)
PMV BLD AUTO: 9.7 FL (ref 6–12)
POTASSIUM SERPL-SCNC: 4.3 MMOL/L (ref 3.5–5.2)
PROT UR QL STRIP: NEGATIVE
PROTHROMBIN TIME: 13.2 SECONDS (ref 12.1–15)
RBC # BLD AUTO: 4.86 10*6/MM3 (ref 3.77–5.28)
RBC # UR: ABNORMAL /HPF
REF LAB TEST METHOD: ABNORMAL
SODIUM SERPL-SCNC: 139 MMOL/L (ref 136–145)
SP GR UR STRIP: 1.02 (ref 1–1.03)
SQUAMOUS #/AREA URNS HPF: ABNORMAL /HPF
UROBILINOGEN UR QL STRIP: ABNORMAL
WBC # BLD AUTO: 7.07 10*3/MM3 (ref 3.4–10.8)
WBC UR QL AUTO: ABNORMAL /HPF

## 2020-12-15 PROCEDURE — 83036 HEMOGLOBIN GLYCOSYLATED A1C: CPT | Performed by: ORTHOPAEDIC SURGERY

## 2020-12-15 PROCEDURE — 81001 URINALYSIS AUTO W/SCOPE: CPT | Performed by: ORTHOPAEDIC SURGERY

## 2020-12-15 PROCEDURE — 87088 URINE BACTERIA CULTURE: CPT | Performed by: ORTHOPAEDIC SURGERY

## 2020-12-15 PROCEDURE — 93010 ELECTROCARDIOGRAM REPORT: CPT | Performed by: INTERNAL MEDICINE

## 2020-12-15 PROCEDURE — 80048 BASIC METABOLIC PNL TOTAL CA: CPT | Performed by: ORTHOPAEDIC SURGERY

## 2020-12-15 PROCEDURE — 85730 THROMBOPLASTIN TIME PARTIAL: CPT | Performed by: ORTHOPAEDIC SURGERY

## 2020-12-15 PROCEDURE — 93005 ELECTROCARDIOGRAM TRACING: CPT

## 2020-12-15 PROCEDURE — 87186 SC STD MICRODIL/AGAR DIL: CPT | Performed by: ORTHOPAEDIC SURGERY

## 2020-12-15 PROCEDURE — 87086 URINE CULTURE/COLONY COUNT: CPT | Performed by: ORTHOPAEDIC SURGERY

## 2020-12-15 PROCEDURE — 85025 COMPLETE CBC W/AUTO DIFF WBC: CPT | Performed by: ORTHOPAEDIC SURGERY

## 2020-12-15 PROCEDURE — 36415 COLL VENOUS BLD VENIPUNCTURE: CPT

## 2020-12-15 PROCEDURE — 87081 CULTURE SCREEN ONLY: CPT | Performed by: ORTHOPAEDIC SURGERY

## 2020-12-15 PROCEDURE — 85610 PROTHROMBIN TIME: CPT | Performed by: ORTHOPAEDIC SURGERY

## 2020-12-15 NOTE — PAT
Patient here for PAT/joint camp class. Pre-op instructions reviewed, labs, EKG complete. Appointment with PCP on 12/17. Patient states she cannot wear cheap earrings, actually causes her earlobes to get infected. Pain pump reviewed with pt. Will message Dr. Hannah about possible nickel allergy.

## 2020-12-15 NOTE — DISCHARGE INSTRUCTIONS
To stop clears/gatorade 2 hours prior to arrival time    To hold vitamin D3 for 1 week prior to surgery    PRE-ADMISSION TESTING INSTRUCTIONS FOR TOTAL JOINT PATIENTS    Take these medications the morning of surgery with a small sip of water: Levothyroxine, Omeprazole, use albuterol    Bring albuterol inhaler & Bi Pap machine      No aspirin, advil, aleve, ibuprofen, naproxen, diet pills, decongestants, or vitamin/herbal supplements for a week prior to your surgery.    Do not take any insulin or diabetes medications the morning of surgery.      Start your Bactroban ointment on __12/26/2020_________.  You will need to apply the ointment with a clean Q-tip 3 times a day in both sides of your nose for 5 days and the morning of surgery.    General Instructions:    • Do not eat solid food after midnight the night before surgery.  No gum, mints, or hard candy after midnight the night before surgery.  • You may drink clear liquids the day of surgery up until 2 hours before your arrival time.  • Clear liquids are liquids you can see through. Nothing RED in color.    Plain water    Sports drinks  Sodas     Gelatin (Jell-O)  Fruit juices without pulp such as white grape juice and apple juice  Popsicles that contain no fruit or yogurt  Tea or coffee (no cream or milk added)    • It is beneficial for you to have a clear drink that contains carbohydrates just before you leave your house and before your fasting time begins.  We suggest a 20 ounce bottle of Gatorade or Powerade for non-diabetic patients or a 20 ounce bottle of G2 or Powerade Zero for diabetic patients.     • Patients who avoid smoking, chewing tobacco and alcohol for 4 weeks prior to surgery have a reduced risk of post-operative complications.  If at all possible, quit smoking as many days before surgery as you can.    • Do not smoke, use chewing tobacco or drink alcohol after midnight the day of surgery.    • Bring your C-PAP/ BI-PAP machine if you use  one.  • Wear clean comfortable clothes and socks.  • Do not wear contact lenses, lotion, deodorant, or make-up.  Bring a case for your glasses if applicable.   • You may brush your teeth the morning of surgery.  • You may wear dentures/partials, do not put adhesive/glue on them.  • Bring crutches or walker if applicable.  • Leave all other jewelry and valuables at home.  • NOTIFY YOUR SURGEON IF YOU BECOME ILL, HAVE A FEVER, PRODUCTIVE COUGH, OR CANNOT BE HERE THE DAY OF SURGERY.      Preventing a Surgical Site Infection:    • Shower the night before and on the morning of surgery using the chlorhexidine soap you were given.  Use a clean washcloth with the soap.  Place clean sheets on your bed after showering the night before surgery. Do not use the CHG soap on your hair, face, or private areas. Wash your body gently for five (5) minutes. Do not scrub your skin.  Dry with a clean towel and dress in clean clothing.    • Do not shave the surgical area for 10 days-2 weeks prior to surgery  because the razor can irritate skin and make it easier to develop an infection.  • Make sure you, your family, and all healthcare providers clean their hands with soap and water or an alcohol based hand  before caring for you or your wound.      Day of surgery:    Your surgeon’s office will advise you of your arrival time for the day of surgery.    Upon arrival, a Pre-op nurse and Anesthesia provider will review your health history, obtain vital signs, and answer questions you may have.  The only belongings needed at this time will be your home medications and if applicable your C-PAP/BI-PAP machine.  If you are staying overnight your family can leave the rest of your belongings in the car and bring them to your room later.  A Pre-op nurse will start an IV and you may receive medication in preparation for surgery, including something to help you relax.  Your family will be able to see you in the Pre-op area.  While you are in  surgery your family should notify the waiting room  if they leave the waiting room area and provide a contact phone number.    If you have any questions, you can call the Pre-Admission Department at [phone] or your surgeon's office.    Please be aware that surgery does come with discomfort.  We want to make every effort to control your discomfort so please discuss any uncontrolled symptoms with your nurse.   Your doctor will most likely have prescribed pain medications.     You may have bruising or discomfort from the tourniquet used in surgery.     Please leave all luggage in the car the morning of surgery.  You will be transported to your hospital room following the recovery period.  Your family can get your luggage at that time.      You may receive a survey regarding the care you received. Your feedback is very important and will be used to collect the necessary data to help us to continue to provide excellent care.

## 2020-12-16 LAB
MRSA SPEC QL CULT: NORMAL
QT INTERVAL: 395 MS

## 2020-12-17 LAB — BACTERIA SPEC AEROBE CULT: ABNORMAL

## 2020-12-22 ENCOUNTER — OFFICE VISIT (OUTPATIENT)
Dept: ORTHOPEDIC SURGERY | Facility: CLINIC | Age: 75
End: 2020-12-22

## 2020-12-22 VITALS — WEIGHT: 201 LBS | BODY MASS INDEX: 37.95 KG/M2 | HEIGHT: 61 IN

## 2020-12-22 DIAGNOSIS — M17.11 PRIMARY OSTEOARTHRITIS OF RIGHT KNEE: Primary | ICD-10-CM

## 2020-12-22 PROCEDURE — 99024 POSTOP FOLLOW-UP VISIT: CPT | Performed by: ORTHOPAEDIC SURGERY

## 2020-12-22 RX ORDER — TRIAMTERENE AND HYDROCHLOROTHIAZIDE 37.5; 25 MG/1; MG/1
1 TABLET ORAL DAILY
COMMUNITY
Start: 2020-12-17

## 2020-12-22 ASSESSMENT — KOOS JR
KOOS JR SCORE: 47.487
KOOS JR SCORE: 16

## 2020-12-22 NOTE — H&P
History & Physical       Patient: Jaz Harley    YOB: 1945    Medical Record Number: [id number]    Surgeon:  Dr. Sj Hannah    Chief Complaints:   Chief Complaint   Patient presents with   • Right Knee - Follow-up, Pain       Subjective:  This problem is not new to this examiner.     History of Present Illness: 75 y.o. female presents with   Chief Complaint   Patient presents with   • Right Knee - Follow-up, Pain   . Onset of symptoms was years ago and has been progressively worsening despite more conservative treatment measures.  Symptoms are associated with ability to move, exercise, and perform activities of daily living.  Symptoms are aggravated by weight bearing and ROM necessary for activities of daily living.   Symptoms improve with rest, ice and elevation only minimally.      Allergies:   Allergies   Allergen Reactions   • Penicillins Hives   • Sulfa Antibiotics Rash   • Trimethoprim Hives   • Ibuprofen Other (See Comments)     Stage 3 kidney disease   • Diazepam Rash       Medications:   Home Medications:  Current Outpatient Medications on File Prior to Visit   Medication Sig   • albuterol sulfate  (90 Base) MCG/ACT inhaler Inhale 2 puffs.   • atorvastatin (LIPITOR) 20 MG tablet Take 20 mg by mouth Every Night.   • bisoprolol (ZEBeta) 5 MG tablet Take 5 mg by mouth Every Night.   • CBD (cannabidiol) oral oil Take  by mouth.   • Dulaglutide (TRULICITY) 0.75 MG/0.5ML solution pen-injector Inject 1.5 mg under the skin into the appropriate area as directed 1 (One) Time Per Week.   • fexofenadine (ALLEGRA) 180 MG tablet Take 180 mg by mouth Daily As Needed.   • furosemide (LASIX) 40 MG tablet Take 40 mg by mouth Daily As Needed.   • gabapentin (NEURONTIN) 300 MG capsule Take 300 mg by mouth 2 (Two) Times a Day.   • glyburide (DIAbeta) 5 MG tablet Take 5 mg by mouth 2 (two) times a day.   • levothyroxine (SYNTHROID, LEVOTHROID) 50 MCG tablet Take 50 mcg by mouth Every  Morning.   • metFORMIN (GLUCOPHAGE) 500 MG tablet Take 500 mg by mouth 2 (Two) Times a Day.   • nitroglycerin (NITROSTAT) 0.4 MG SL tablet Place 0.4 mg under the tongue.   • omeprazole (priLOSEC) 40 MG capsule Take 20 mg by mouth Every Morning.   • phenazopyridine (PYRIDIUM) 100 MG tablet Take 100 mg by mouth 3 (Three) Times a Day As Needed.   • sulfacetamide (BLEPH-10) 10 % ophthalmic solution Administer 1 drop to both eyes Daily As Needed.   • triamterene-hydrochlorothiazide (MAXZIDE-25) 37.5-25 MG per tablet    • Cholecalciferol (Vitamin D3) 25 MCG (1000 UT) capsule Take 4,000 Units by mouth Every Morning.     No current facility-administered medications on file prior to visit.      Current Medications:  Scheduled Meds:  Continuous Infusions:No current facility-administered medications for this visit.     PRN Meds:.    I have reviewed the patient's medical history in detail and updated the computerized patient record.  Review and summarization of old records include:    Past Medical History:   Diagnosis Date   • Anxiety    • Arthritis     knees, hands   • Asthma    • Diabetes (CMS/HCC)     last a1c 6.1   • Fibromyalgia    • Fracture, radius    • GERD (gastroesophageal reflux disease)    • Hyperlipidemia    • Hypertension    • Hyperthyroidism    • Right knee pain     scheduled for TKA   • Sleep apnea     uses bipap   • Stage 3 chronic kidney disease    • Stroke (CMS/HCC)     found on ct scan, no residuals         Past Surgical History:   Procedure Laterality Date   • APPENDECTOMY     • BLEPHAROPLASTY     • CARPAL TUNNEL RELEASE Right    • CERVICAL DISCECTOMY ANTERIOR     • HYSTERECTOMY      abd   • MASTOIDECTOMY          Social History     Occupational History   • Not on file   Tobacco Use   • Smoking status: Former Smoker     Quit date:      Years since quittin.0   • Smokeless tobacco: Never Used   Substance and Sexual Activity   • Alcohol use: No     Frequency: Never   • Drug use: Never   • Sexual  activity: Defer      Social History     Social History Narrative   • Not on file        Family History   Problem Relation Age of Onset   • Cancer Mother    • Cancer Father    • Diabetes Sister    • Cancer Sister        ROS: 14 point review of systems was performed and was negative except for documented findings in HPI and today's encounter.     Allergies:   Allergies   Allergen Reactions   • Penicillins Hives   • Sulfa Antibiotics Rash   • Trimethoprim Hives   • Ibuprofen Other (See Comments)     Stage 3 kidney disease   • Diazepam Rash     Constitutional:  Denies fever, shaking or chills   Eyes:  Denies change in visual acuity   HENT:  Denies nasal congestion or sore throat   Respiratory:  Denies cough or shortness of breath   Cardiovascular:  Denies chest pain or severe LE edema   GI:  Denies abdominal pain, nausea, vomiting, bloody stools or diarrhea   Musculoskeletal:  Denies numbness tingling or loss of motor function except as outlined above in history of present illness.  : Denies painful urination or hematuria  Integument:  Denies rash, lesion or ulceration   Neurologic:  Denies headache or focal weakness  Endocrine:  Denies lymphadenopathy  Psych:  Denies confusion or change in mental status   Hem:  Denies active bleeding    Physical Exam: 75 y.o. female  Body mass index is 38.61 kg/m².  There were no vitals filed for this visit.  Vital signs reviewed.   General Appearance:    Alert, cooperative, in no acute distress                  Eyes: conjunctiva clear  ENT: external ears and nose atraumatic  CV: no peripheral edema  Resp: normal respiratory effort  Skin: no rashes or wounds; normal turgor  Psych: mood and affect appropriate  Lymph: no nodes appreciated  Neuro: gross sensation intact  Vascular:  Palpable peripheral pulse in noted extremity  Musculoskeletal Extremities: Right Knee-     ROM 0-120 degrees  4/5 on flexion  4/5 on extension  Maximal tenderness medial joint line     Effusion- moderate    Grade 1A Lachman  No opening on varus and valgus stress at 0 and 30  Active patellar compression test- positive      Log roll-  negative  Stinchfield-  negative     Positive sensation light tough all distributions symmetric to contralateral side  Brisk cap refill all digits  1+ dorsalis pedis pulse    Debilities/Disabilities Identified: None      Diagnostic Tests:  Appointment on 12/15/2020   Component Date Value Ref Range Status   • QT Interval 12/15/2020 395  ms Final   • Color, UA 12/15/2020 Yellow  Yellow, Straw Final   • Appearance, UA 12/15/2020 Slightly Cloudy* Clear Final   • pH, UA 12/15/2020 <=5.0  4.5 - 8.0 Final   • Specific Gravity, UA 12/15/2020 1.020  1.003 - 1.030 Final   • Glucose, UA 12/15/2020 Negative  Negative Final   • Ketones, UA 12/15/2020 Negative  Negative Final   • Bilirubin, UA 12/15/2020 Negative  Negative Final   • Blood, UA 12/15/2020 Negative  Negative Final   • Protein, UA 12/15/2020 Negative  Negative Final   • Leuk Esterase, UA 12/15/2020 Trace* Negative Final   • Nitrite, UA 12/15/2020 Positive* Negative Final   • Urobilinogen, UA 12/15/2020 0.2 E.U./dL  0.2 - 1.0 E.U./dL Final   • Hemoglobin A1C 12/15/2020 7.20* 4.80 - 5.60 % Final   • PTT 12/15/2020 28.8  24.3 - 38.1 seconds Final   • Protime 12/15/2020 13.2  12.1 - 15.0 Seconds Final   • INR 12/15/2020 1.03  0.90 - 1.10 Final   • Glucose 12/15/2020 111* 65 - 99 mg/dL Final   • BUN 12/15/2020 16  8 - 23 mg/dL Final   • Creatinine 12/15/2020 1.07* 0.57 - 1.00 mg/dL Final   • Sodium 12/15/2020 139  136 - 145 mmol/L Final   • Potassium 12/15/2020 4.3  3.5 - 5.2 mmol/L Final   • Chloride 12/15/2020 104  98 - 107 mmol/L Final   • CO2 12/15/2020 23.8  22.0 - 29.0 mmol/L Final   • Calcium 12/15/2020 8.9  8.6 - 10.5 mg/dL Final   • eGFR Non African Amer 12/15/2020 50* >60 mL/min/1.73 Final   • BUN/Creatinine Ratio 12/15/2020 15.0  7.0 - 25.0 Final   • Anion Gap 12/15/2020 11.2  5.0 - 15.0 mmol/L Final   • MRSA Screen Cx 12/15/2020 No  Methicillin Resistant Staphylococcus aureus isolated   Final   • RBC, UA 12/15/2020 None Seen  None Seen /HPF Final   • WBC, UA 12/15/2020 13-20* None Seen /HPF Final   • Bacteria, UA 12/15/2020 4+* None Seen /HPF Final   • Squamous Epithelial Cells, UA 12/15/2020 0-2  None Seen, 0-2 /HPF Final   • Hyaline Casts, UA 12/15/2020 None Seen  None Seen /LPF Final   • Methodology 12/15/2020 Manual Light Microscopy   Final   • Urine Culture 12/15/2020 >100,000 CFU/mL Escherichia coli*  Final   • WBC 12/15/2020 7.07  3.40 - 10.80 10*3/mm3 Final   • RBC 12/15/2020 4.86  3.77 - 5.28 10*6/mm3 Final   • Hemoglobin 12/15/2020 13.1  12.0 - 15.9 g/dL Final   • Hematocrit 12/15/2020 41.1  34.0 - 46.6 % Final   • MCV 12/15/2020 84.6  79.0 - 97.0 fL Final   • MCH 12/15/2020 27.0  26.6 - 33.0 pg Final   • MCHC 12/15/2020 31.9  31.5 - 35.7 g/dL Final   • RDW 12/15/2020 14.0  12.3 - 15.4 % Final   • RDW-SD 12/15/2020 43.1  37.0 - 54.0 fl Final   • MPV 12/15/2020 9.7  6.0 - 12.0 fL Final   • Platelets 12/15/2020 277  140 - 450 10*3/mm3 Final   • Neutrophil % 12/15/2020 60.9  42.7 - 76.0 % Final   • Lymphocyte % 12/15/2020 27.9  19.6 - 45.3 % Final   • Monocyte % 12/15/2020 7.4  5.0 - 12.0 % Final   • Eosinophil % 12/15/2020 2.8  0.3 - 6.2 % Final   • Basophil % 12/15/2020 0.4  0.0 - 1.5 % Final   • Immature Grans % 12/15/2020 0.6* 0.0 - 0.5 % Final   • Neutrophils, Absolute 12/15/2020 4.31  1.70 - 7.00 10*3/mm3 Final   • Lymphocytes, Absolute 12/15/2020 1.97  0.70 - 3.10 10*3/mm3 Final   • Monocytes, Absolute 12/15/2020 0.52  0.10 - 0.90 10*3/mm3 Final   • Eosinophils, Absolute 12/15/2020 0.20  0.00 - 0.40 10*3/mm3 Final   • Basophils, Absolute 12/15/2020 0.03  0.00 - 0.20 10*3/mm3 Final   • Immature Grans, Absolute 12/15/2020 0.04  0.00 - 0.05 10*3/mm3 Final     No results found.    Assessment:  Patient Active Problem List   Diagnosis   • Tear of medial meniscus of right knee, current   • Stress fracture of right tibia   • Primary  osteoarthritis of right knee   • Aortic valve sclerosis   • Benign essential HTN   • Thyroid disease   • Fibromyalgia   • GERD (gastroesophageal reflux disease)   • Hearing loss in left ear   • Hyperlipidemia   • Obesity   • Obstructive sleep apnea   • Pericardial effusion   • Tricuspid regurgitation       Plan:  I reviewed anatomy of a total joint arthroplasty in laymen's terms, as well as typical postoperative recovery and possibly 6-12 months for maximal recovery, and possible need for rehabilitation stay after hospitalization. We also discussed risks, benefits, alternatives, and limitations of procedure with risks including but not limited to neurovascular damage, bleeding, infection, malalignment, chronic pian, failure of implants, osteolysis, loosening of implants, loss of motion, weakness, stiffness, instability, DVT, pulmonary embolus, death, stroke, complex regional pain syndrome, myocardial infarction, and need for additional procedures. No guarantees were given regarding results of surgery.      Jaz Johannakristin Harley was given the opportunity to ask and have all questions answered today.  The patient voiced understanding of the risks, benefits, and alternative forms of treatment that were discussed and the patient consents to proceed with surgery.     Patient's blood clot history is negative.    Plan for DVT prophylaxis is ASA    Patient's MRSA infection history is negative.    Patient's skin infection history is negative.    Discharge Plan: POD 2-3 to home    Date: 12/22/2020    Dictated utilizing Dragon dictation

## 2020-12-29 ENCOUNTER — PREP FOR SURGERY (OUTPATIENT)
Dept: OTHER | Facility: HOSPITAL | Age: 75
End: 2020-12-29

## 2020-12-29 ENCOUNTER — LAB (OUTPATIENT)
Dept: LAB | Facility: HOSPITAL | Age: 75
End: 2020-12-29

## 2020-12-29 DIAGNOSIS — M17.11 PRIMARY OSTEOARTHRITIS OF RIGHT KNEE: Primary | ICD-10-CM

## 2020-12-29 DIAGNOSIS — Z01.818 PREOP EXAMINATION: ICD-10-CM

## 2020-12-29 LAB — SARS-COV-2 ORF1AB RESP QL NAA+PROBE: NOT DETECTED

## 2020-12-29 PROCEDURE — C9803 HOPD COVID-19 SPEC COLLECT: HCPCS | Performed by: OBSTETRICS & GYNECOLOGY

## 2020-12-29 PROCEDURE — U0004 COV-19 TEST NON-CDC HGH THRU: HCPCS | Performed by: OBSTETRICS & GYNECOLOGY

## 2020-12-29 RX ORDER — VANCOMYCIN 1.5 G/300ML
1500 INJECTION, SOLUTION INTRAVENOUS ONCE
Status: CANCELLED | OUTPATIENT
Start: 2020-12-31

## 2020-12-30 ENCOUNTER — ANESTHESIA EVENT (OUTPATIENT)
Dept: PERIOP | Facility: HOSPITAL | Age: 75
End: 2020-12-30

## 2020-12-30 ENCOUNTER — HOSPITAL ENCOUNTER (OUTPATIENT)
Dept: GENERAL RADIOLOGY | Facility: HOSPITAL | Age: 75
Discharge: HOME | End: 2020-12-30

## 2020-12-31 ENCOUNTER — HOSPITAL ENCOUNTER (OUTPATIENT)
Facility: HOSPITAL | Age: 75
Discharge: HOME HEALTH | End: 2021-01-01
Attending: ORTHOPAEDIC SURGERY | Admitting: ORTHOPAEDIC SURGERY

## 2020-12-31 ENCOUNTER — ANESTHESIA (OUTPATIENT)
Dept: PERIOP | Facility: HOSPITAL | Age: 75
End: 2020-12-31

## 2020-12-31 ENCOUNTER — APPOINTMENT (OUTPATIENT)
Dept: GENERAL RADIOLOGY | Facility: HOSPITAL | Age: 75
End: 2020-12-31

## 2020-12-31 DIAGNOSIS — M17.11 PRIMARY OSTEOARTHRITIS OF RIGHT KNEE: ICD-10-CM

## 2020-12-31 DIAGNOSIS — Z96.651 STATUS POST TOTAL RIGHT KNEE REPLACEMENT: Primary | ICD-10-CM

## 2020-12-31 LAB
ABO GROUP BLD: NORMAL
ABO GROUP BLD: NORMAL
BLD GP AB SCN SERPL QL: NEGATIVE
BUN SERPL-MCNC: 21 MG/DL (ref 8–23)
CREAT SERPL-MCNC: 1.22 MG/DL (ref 0.57–1)
GFR SERPL CREATININE-BSD FRML MDRD: 43 ML/MIN/1.73
GLUCOSE BLDC GLUCOMTR-MCNC: 126 MG/DL (ref 70–130)
POTASSIUM SERPL-SCNC: 3.9 MMOL/L (ref 3.5–5.2)
RH BLD: NEGATIVE
RH BLD: NEGATIVE
T&S EXPIRATION DATE: NORMAL

## 2020-12-31 PROCEDURE — L1830 KO IMMOB CANVAS LONG PRE OTS: HCPCS | Performed by: ORTHOPAEDIC SURGERY

## 2020-12-31 PROCEDURE — 27447 TOTAL KNEE ARTHROPLASTY: CPT | Performed by: ORTHOPAEDIC SURGERY

## 2020-12-31 PROCEDURE — C1713 ANCHOR/SCREW BN/BN,TIS/BN: HCPCS | Performed by: ORTHOPAEDIC SURGERY

## 2020-12-31 PROCEDURE — 86900 BLOOD TYPING SEROLOGIC ABO: CPT

## 2020-12-31 PROCEDURE — 0396T: CPT | Performed by: ORTHOPAEDIC SURGERY

## 2020-12-31 PROCEDURE — 97165 OT EVAL LOW COMPLEX 30 MIN: CPT

## 2020-12-31 PROCEDURE — A9270 NON-COVERED ITEM OR SERVICE: HCPCS | Performed by: ORTHOPAEDIC SURGERY

## 2020-12-31 PROCEDURE — 73560 X-RAY EXAM OF KNEE 1 OR 2: CPT

## 2020-12-31 PROCEDURE — 63710000001 PREGABALIN 75 MG CAPSULE: Performed by: ORTHOPAEDIC SURGERY

## 2020-12-31 PROCEDURE — 99203 OFFICE O/P NEW LOW 30 MIN: CPT | Performed by: INTERNAL MEDICINE

## 2020-12-31 PROCEDURE — 76942 ECHO GUIDE FOR BIOPSY: CPT | Performed by: ORTHOPAEDIC SURGERY

## 2020-12-31 PROCEDURE — 94799 UNLISTED PULMONARY SVC/PX: CPT

## 2020-12-31 PROCEDURE — 25010000002 VANCOMYCIN HCL 1250 MG/250ML SOLUTION: Performed by: ORTHOPAEDIC SURGERY

## 2020-12-31 PROCEDURE — 86900 BLOOD TYPING SEROLOGIC ABO: CPT | Performed by: ORTHOPAEDIC SURGERY

## 2020-12-31 PROCEDURE — 82565 ASSAY OF CREATININE: CPT | Performed by: INTERNAL MEDICINE

## 2020-12-31 PROCEDURE — 84520 ASSAY OF UREA NITROGEN: CPT | Performed by: INTERNAL MEDICINE

## 2020-12-31 PROCEDURE — 86901 BLOOD TYPING SEROLOGIC RH(D): CPT | Performed by: ORTHOPAEDIC SURGERY

## 2020-12-31 PROCEDURE — 63710000001 ACETAMINOPHEN 325 MG TABLET: Performed by: ORTHOPAEDIC SURGERY

## 2020-12-31 PROCEDURE — 82962 GLUCOSE BLOOD TEST: CPT

## 2020-12-31 PROCEDURE — 25010000002 ROPIVACAINE PER 1 MG: Performed by: NURSE ANESTHETIST, CERTIFIED REGISTERED

## 2020-12-31 PROCEDURE — 63710000001 ACETAMINOPHEN 500 MG TABLET: Performed by: ORTHOPAEDIC SURGERY

## 2020-12-31 PROCEDURE — 25010000003 LIDOCAINE 1 % SOLUTION: Performed by: NURSE ANESTHETIST, CERTIFIED REGISTERED

## 2020-12-31 PROCEDURE — C1776 JOINT DEVICE (IMPLANTABLE): HCPCS | Performed by: ORTHOPAEDIC SURGERY

## 2020-12-31 PROCEDURE — 25010000002 DEXAMETHASONE PER 1 MG: Performed by: NURSE ANESTHETIST, CERTIFIED REGISTERED

## 2020-12-31 PROCEDURE — 25010000002 MIDAZOLAM PER 1MG: Performed by: NURSE ANESTHETIST, CERTIFIED REGISTERED

## 2020-12-31 PROCEDURE — 25010000002 VANCOMYCIN 1 G RECONSTITUTED SOLUTION: Performed by: ORTHOPAEDIC SURGERY

## 2020-12-31 PROCEDURE — 84132 ASSAY OF SERUM POTASSIUM: CPT | Performed by: ORTHOPAEDIC SURGERY

## 2020-12-31 PROCEDURE — 25010000002 DIPHENHYDRAMINE PER 50 MG: Performed by: NURSE ANESTHETIST, CERTIFIED REGISTERED

## 2020-12-31 PROCEDURE — 25010000002 PROPOFOL 10 MG/ML EMULSION: Performed by: NURSE ANESTHETIST, CERTIFIED REGISTERED

## 2020-12-31 PROCEDURE — G0378 HOSPITAL OBSERVATION PER HR: HCPCS

## 2020-12-31 PROCEDURE — 63710000001 POVIDONE-IODINE 10 % SOLUTION 118 ML BOTTLE: Performed by: ORTHOPAEDIC SURGERY

## 2020-12-31 PROCEDURE — 63710000001 ATORVASTATIN 20 MG TABLET: Performed by: ORTHOPAEDIC SURGERY

## 2020-12-31 PROCEDURE — 63710000001 BISOPROLOL 5 MG TABLET: Performed by: ORTHOPAEDIC SURGERY

## 2020-12-31 PROCEDURE — 25010000002 VANCOMYCIN HCL 1500 MG/300ML SOLUTION: Performed by: ORTHOPAEDIC SURGERY

## 2020-12-31 PROCEDURE — 86901 BLOOD TYPING SEROLOGIC RH(D): CPT

## 2020-12-31 PROCEDURE — 25010000002 ONDANSETRON PER 1 MG: Performed by: NURSE ANESTHETIST, CERTIFIED REGISTERED

## 2020-12-31 PROCEDURE — 97161 PT EVAL LOW COMPLEX 20 MIN: CPT

## 2020-12-31 PROCEDURE — 86850 RBC ANTIBODY SCREEN: CPT | Performed by: ORTHOPAEDIC SURGERY

## 2020-12-31 DEVICE — CAP TOTL KN CMT PRIMARY: Type: IMPLANTABLE DEVICE | Site: KNEE | Status: FUNCTIONAL

## 2020-12-31 DEVICE — CMT BONE REFOBACIN R W/GENT 1X40: Type: IMPLANTABLE DEVICE | Site: KNEE | Status: FUNCTIONAL

## 2020-12-31 DEVICE — DEV WND/CLS CONTRL TISS STRATAFIX SYMM PDS PLS CTX 60CM VIL: Type: IMPLANTABLE DEVICE | Site: KNEE | Status: FUNCTIONAL

## 2020-12-31 DEVICE — ART/SRF KN PERSONA/VE MC CD 4TO5 12MM RT: Type: IMPLANTABLE DEVICE | Site: KNEE | Status: FUNCTIONAL

## 2020-12-31 DEVICE — DEV CONTRL TISS STRATAFIX SPIRAL MNCRYL UD 3/0 PLS 45CM: Type: IMPLANTABLE DEVICE | Site: KNEE | Status: FUNCTIONAL

## 2020-12-31 DEVICE — IMPLANTABLE DEVICE: Type: IMPLANTABLE DEVICE | Status: FUNCTIONAL

## 2020-12-31 DEVICE — STEM TIB/KN PERSONA CMT 5D SZC RT: Type: IMPLANTABLE DEVICE | Site: KNEE | Status: FUNCTIONAL

## 2020-12-31 DEVICE — PAT KN PERSONA VE CRS/LNK CMT 8X29MM: Type: IMPLANTABLE DEVICE | Site: KNEE | Status: FUNCTIONAL

## 2020-12-31 RX ORDER — VANCOMYCIN 1.5 G/300ML
1500 INJECTION, SOLUTION INTRAVENOUS ONCE
Status: COMPLETED | OUTPATIENT
Start: 2020-12-31 | End: 2020-12-31

## 2020-12-31 RX ORDER — SODIUM CHLORIDE 9 MG/ML
40 INJECTION, SOLUTION INTRAVENOUS AS NEEDED
Status: DISCONTINUED | OUTPATIENT
Start: 2020-12-31 | End: 2020-12-31 | Stop reason: HOSPADM

## 2020-12-31 RX ORDER — NITROGLYCERIN 0.4 MG/1
0.4 TABLET SUBLINGUAL
Status: DISCONTINUED | OUTPATIENT
Start: 2020-12-31 | End: 2021-01-01 | Stop reason: HOSPADM

## 2020-12-31 RX ORDER — FAMOTIDINE 10 MG/ML
20 INJECTION, SOLUTION INTRAVENOUS
Status: COMPLETED | OUTPATIENT
Start: 2020-12-31 | End: 2020-12-31

## 2020-12-31 RX ORDER — LEVOTHYROXINE SODIUM 0.05 MG/1
50 TABLET ORAL EVERY MORNING
Status: DISCONTINUED | OUTPATIENT
Start: 2020-12-31 | End: 2021-01-01 | Stop reason: HOSPADM

## 2020-12-31 RX ORDER — VANCOMYCIN HYDROCHLORIDE 1 G/20ML
INJECTION, POWDER, LYOPHILIZED, FOR SOLUTION INTRAVENOUS AS NEEDED
Status: DISCONTINUED | OUTPATIENT
Start: 2020-12-31 | End: 2020-12-31 | Stop reason: HOSPADM

## 2020-12-31 RX ORDER — DEXAMETHASONE SODIUM PHOSPHATE 4 MG/ML
8 INJECTION, SOLUTION INTRA-ARTICULAR; INTRALESIONAL; INTRAMUSCULAR; INTRAVENOUS; SOFT TISSUE ONCE AS NEEDED
Status: COMPLETED | OUTPATIENT
Start: 2020-12-31 | End: 2020-12-31

## 2020-12-31 RX ORDER — HYDROMORPHONE HYDROCHLORIDE 1 MG/ML
0.5 INJECTION, SOLUTION INTRAMUSCULAR; INTRAVENOUS; SUBCUTANEOUS
Status: DISCONTINUED | OUTPATIENT
Start: 2020-12-31 | End: 2020-12-31 | Stop reason: HOSPADM

## 2020-12-31 RX ORDER — ALBUTEROL SULFATE 90 UG/1
2 AEROSOL, METERED RESPIRATORY (INHALATION) EVERY 6 HOURS PRN
Status: DISCONTINUED | OUTPATIENT
Start: 2020-12-31 | End: 2021-01-01 | Stop reason: HOSPADM

## 2020-12-31 RX ORDER — ONDANSETRON 2 MG/ML
4 INJECTION INTRAMUSCULAR; INTRAVENOUS ONCE AS NEEDED
Status: COMPLETED | OUTPATIENT
Start: 2020-12-31 | End: 2020-12-31

## 2020-12-31 RX ORDER — PANTOPRAZOLE SODIUM 40 MG/1
40 TABLET, DELAYED RELEASE ORAL EVERY MORNING
Status: DISCONTINUED | OUTPATIENT
Start: 2020-12-31 | End: 2021-01-01 | Stop reason: HOSPADM

## 2020-12-31 RX ORDER — ACETAMINOPHEN 500 MG
1000 TABLET ORAL ONCE
Status: COMPLETED | OUTPATIENT
Start: 2020-12-31 | End: 2020-12-31

## 2020-12-31 RX ORDER — MECLIZINE HYDROCHLORIDE 25 MG/1
25 TABLET ORAL 3 TIMES DAILY PRN
COMMUNITY

## 2020-12-31 RX ORDER — DIPHENHYDRAMINE HYDROCHLORIDE 50 MG/ML
12.5 INJECTION INTRAMUSCULAR; INTRAVENOUS ONCE
Status: COMPLETED | OUTPATIENT
Start: 2020-12-31 | End: 2020-12-31

## 2020-12-31 RX ORDER — SODIUM CHLORIDE, SODIUM LACTATE, POTASSIUM CHLORIDE, CALCIUM CHLORIDE 600; 310; 30; 20 MG/100ML; MG/100ML; MG/100ML; MG/100ML
9 INJECTION, SOLUTION INTRAVENOUS CONTINUOUS
Status: DISCONTINUED | OUTPATIENT
Start: 2020-12-31 | End: 2020-12-31

## 2020-12-31 RX ORDER — ONDANSETRON 2 MG/ML
4 INJECTION INTRAMUSCULAR; INTRAVENOUS EVERY 6 HOURS PRN
Status: DISCONTINUED | OUTPATIENT
Start: 2020-12-31 | End: 2021-01-01 | Stop reason: HOSPADM

## 2020-12-31 RX ORDER — SODIUM CHLORIDE 0.9 % (FLUSH) 0.9 %
10 SYRINGE (ML) INJECTION AS NEEDED
Status: DISCONTINUED | OUTPATIENT
Start: 2020-12-31 | End: 2020-12-31 | Stop reason: HOSPADM

## 2020-12-31 RX ORDER — MAGNESIUM HYDROXIDE 1200 MG/15ML
LIQUID ORAL AS NEEDED
Status: DISCONTINUED | OUTPATIENT
Start: 2020-12-31 | End: 2020-12-31 | Stop reason: HOSPADM

## 2020-12-31 RX ORDER — LIDOCAINE HYDROCHLORIDE 10 MG/ML
INJECTION, SOLUTION INFILTRATION; PERINEURAL AS NEEDED
Status: DISCONTINUED | OUTPATIENT
Start: 2020-12-31 | End: 2020-12-31 | Stop reason: SURG

## 2020-12-31 RX ORDER — ROPIVACAINE HYDROCHLORIDE 2 MG/ML
INJECTION, SOLUTION EPIDURAL; INFILTRATION; PERINEURAL
Status: DISCONTINUED | OUTPATIENT
Start: 2020-12-31 | End: 2020-12-31 | Stop reason: SURG

## 2020-12-31 RX ORDER — ONDANSETRON 2 MG/ML
4 INJECTION INTRAMUSCULAR; INTRAVENOUS ONCE AS NEEDED
Status: DISCONTINUED | OUTPATIENT
Start: 2020-12-31 | End: 2020-12-31 | Stop reason: HOSPADM

## 2020-12-31 RX ORDER — ACETAMINOPHEN 325 MG/1
950 TABLET ORAL 3 TIMES DAILY
Status: DISCONTINUED | OUTPATIENT
Start: 2020-12-31 | End: 2021-01-01 | Stop reason: HOSPADM

## 2020-12-31 RX ORDER — ROPIVACAINE HYDROCHLORIDE 5 MG/ML
INJECTION, SOLUTION EPIDURAL; INFILTRATION; PERINEURAL
Status: COMPLETED | OUTPATIENT
Start: 2020-12-31 | End: 2020-12-31

## 2020-12-31 RX ORDER — VANCOMYCIN 1.5 G/300ML
1500 INJECTION, SOLUTION INTRAVENOUS ONCE
Status: DISCONTINUED | OUTPATIENT
Start: 2020-12-31 | End: 2020-12-31 | Stop reason: SDUPTHER

## 2020-12-31 RX ORDER — NALOXONE HCL 0.4 MG/ML
0.4 VIAL (ML) INJECTION
Status: DISCONTINUED | OUTPATIENT
Start: 2020-12-31 | End: 2021-01-01 | Stop reason: HOSPADM

## 2020-12-31 RX ORDER — LIDOCAINE HYDROCHLORIDE 10 MG/ML
0.5 INJECTION, SOLUTION EPIDURAL; INFILTRATION; INTRACAUDAL; PERINEURAL ONCE AS NEEDED
Status: DISCONTINUED | OUTPATIENT
Start: 2020-12-31 | End: 2020-12-31 | Stop reason: HOSPADM

## 2020-12-31 RX ORDER — BISOPROLOL FUMARATE 5 MG/1
5 TABLET, FILM COATED ORAL NIGHTLY
Status: DISCONTINUED | OUTPATIENT
Start: 2020-12-31 | End: 2021-01-01 | Stop reason: HOSPADM

## 2020-12-31 RX ORDER — ONDANSETRON 4 MG/1
4 TABLET, FILM COATED ORAL EVERY 6 HOURS PRN
Status: DISCONTINUED | OUTPATIENT
Start: 2020-12-31 | End: 2021-01-01 | Stop reason: HOSPADM

## 2020-12-31 RX ORDER — ACETAMINOPHEN 650 MG/1
650 SUPPOSITORY RECTAL ONCE AS NEEDED
Status: DISCONTINUED | OUTPATIENT
Start: 2020-12-31 | End: 2020-12-31 | Stop reason: HOSPADM

## 2020-12-31 RX ORDER — PREGABALIN 75 MG/1
75 CAPSULE ORAL EVERY 12 HOURS SCHEDULED
Status: DISCONTINUED | OUTPATIENT
Start: 2020-12-31 | End: 2021-01-01 | Stop reason: HOSPADM

## 2020-12-31 RX ORDER — SODIUM CHLORIDE 9 MG/ML
INJECTION, SOLUTION INTRAVENOUS AS NEEDED
Status: DISCONTINUED | OUTPATIENT
Start: 2020-12-31 | End: 2020-12-31 | Stop reason: HOSPADM

## 2020-12-31 RX ORDER — ACETAMINOPHEN 325 MG/1
650 TABLET ORAL ONCE AS NEEDED
Status: DISCONTINUED | OUTPATIENT
Start: 2020-12-31 | End: 2020-12-31 | Stop reason: HOSPADM

## 2020-12-31 RX ORDER — BUPIVACAINE HYDROCHLORIDE 5 MG/ML
INJECTION, SOLUTION PERINEURAL
Status: COMPLETED | OUTPATIENT
Start: 2020-12-31 | End: 2020-12-31

## 2020-12-31 RX ORDER — SODIUM CHLORIDE 0.9 % (FLUSH) 0.9 %
10 SYRINGE (ML) INJECTION EVERY 12 HOURS SCHEDULED
Status: DISCONTINUED | OUTPATIENT
Start: 2020-12-31 | End: 2020-12-31 | Stop reason: HOSPADM

## 2020-12-31 RX ORDER — MELOXICAM 7.5 MG/1
15 TABLET ORAL ONCE
Status: DISCONTINUED | OUTPATIENT
Start: 2020-12-31 | End: 2020-12-31 | Stop reason: HOSPADM

## 2020-12-31 RX ORDER — MEPERIDINE HYDROCHLORIDE 25 MG/ML
12.5 INJECTION INTRAMUSCULAR; INTRAVENOUS; SUBCUTANEOUS
Status: DISCONTINUED | OUTPATIENT
Start: 2020-12-31 | End: 2020-12-31 | Stop reason: HOSPADM

## 2020-12-31 RX ORDER — PROPOFOL 10 MG/ML
VIAL (ML) INTRAVENOUS CONTINUOUS PRN
Status: DISCONTINUED | OUTPATIENT
Start: 2020-12-31 | End: 2020-12-31 | Stop reason: SURG

## 2020-12-31 RX ORDER — PROPOFOL 10 MG/ML
VIAL (ML) INTRAVENOUS AS NEEDED
Status: DISCONTINUED | OUTPATIENT
Start: 2020-12-31 | End: 2020-12-31 | Stop reason: SURG

## 2020-12-31 RX ORDER — MIDAZOLAM HYDROCHLORIDE 2 MG/2ML
1 INJECTION, SOLUTION INTRAMUSCULAR; INTRAVENOUS
Status: DISCONTINUED | OUTPATIENT
Start: 2020-12-31 | End: 2020-12-31 | Stop reason: HOSPADM

## 2020-12-31 RX ORDER — OXYCODONE HYDROCHLORIDE 5 MG/1
10 TABLET ORAL EVERY 4 HOURS PRN
Status: DISCONTINUED | OUTPATIENT
Start: 2020-12-31 | End: 2021-01-01 | Stop reason: HOSPADM

## 2020-12-31 RX ORDER — VANCOMYCIN 1.75 G/350ML
15 INJECTION, SOLUTION INTRAVENOUS ONCE
Status: COMPLETED | OUTPATIENT
Start: 2020-12-31 | End: 2020-12-31

## 2020-12-31 RX ORDER — MORPHINE SULFATE 10 MG/ML
6 INJECTION INTRAMUSCULAR; INTRAVENOUS; SUBCUTANEOUS
Status: DISCONTINUED | OUTPATIENT
Start: 2020-12-31 | End: 2021-01-01 | Stop reason: HOSPADM

## 2020-12-31 RX ORDER — SODIUM CHLORIDE 9 MG/ML
100 INJECTION, SOLUTION INTRAVENOUS CONTINUOUS
Status: DISCONTINUED | OUTPATIENT
Start: 2020-12-31 | End: 2020-12-31

## 2020-12-31 RX ORDER — ATORVASTATIN CALCIUM 20 MG/1
20 TABLET, FILM COATED ORAL NIGHTLY
Status: DISCONTINUED | OUTPATIENT
Start: 2020-12-31 | End: 2021-01-01 | Stop reason: HOSPADM

## 2020-12-31 RX ORDER — OXYCODONE HYDROCHLORIDE 5 MG/1
5 TABLET ORAL EVERY 4 HOURS PRN
Status: DISCONTINUED | OUTPATIENT
Start: 2020-12-31 | End: 2021-01-01 | Stop reason: HOSPADM

## 2020-12-31 RX ORDER — PREGABALIN 75 MG/1
150 CAPSULE ORAL ONCE
Status: COMPLETED | OUTPATIENT
Start: 2020-12-31 | End: 2020-12-31

## 2020-12-31 RX ADMIN — SODIUM CHLORIDE 1000 MG: 9 INJECTION, SOLUTION INTRAVENOUS at 09:38

## 2020-12-31 RX ADMIN — PROPOFOL 65 MCG/KG/MIN: 10 INJECTION, EMULSION INTRAVENOUS at 08:00

## 2020-12-31 RX ADMIN — PREGABALIN 150 MG: 75 CAPSULE ORAL at 06:44

## 2020-12-31 RX ADMIN — ACETAMINOPHEN 975 MG: 325 TABLET, FILM COATED ORAL at 21:46

## 2020-12-31 RX ADMIN — FAMOTIDINE 20 MG: 10 INJECTION, SOLUTION INTRAVENOUS at 07:14

## 2020-12-31 RX ADMIN — ATORVASTATIN CALCIUM 20 MG: 20 TABLET, FILM COATED ORAL at 21:46

## 2020-12-31 RX ADMIN — SODIUM CHLORIDE, POTASSIUM CHLORIDE, SODIUM LACTATE AND CALCIUM CHLORIDE 9 ML/HR: 600; 310; 30; 20 INJECTION, SOLUTION INTRAVENOUS at 06:45

## 2020-12-31 RX ADMIN — DIPHENHYDRAMINE HYDROCHLORIDE 12.5 MG: 50 INJECTION, SOLUTION INTRAMUSCULAR; INTRAVENOUS at 07:16

## 2020-12-31 RX ADMIN — MIDAZOLAM HYDROCHLORIDE 1 MG: 1 INJECTION, SOLUTION INTRAMUSCULAR; INTRAVENOUS at 07:32

## 2020-12-31 RX ADMIN — LIDOCAINE HYDROCHLORIDE 60 MG: 10 INJECTION, SOLUTION INFILTRATION; PERINEURAL at 07:59

## 2020-12-31 RX ADMIN — DEXAMETHASONE SODIUM PHOSPHATE 8 MG: 4 INJECTION, SOLUTION INTRAMUSCULAR; INTRAVENOUS at 07:12

## 2020-12-31 RX ADMIN — ROPIVACAINE HYDROCHLORIDE 15 ML: 5 INJECTION, SOLUTION EPIDURAL; INFILTRATION; PERINEURAL at 07:42

## 2020-12-31 RX ADMIN — ACETAMINOPHEN 1000 MG: 500 TABLET ORAL at 06:44

## 2020-12-31 RX ADMIN — ACETAMINOPHEN 975 MG: 325 TABLET, FILM COATED ORAL at 17:29

## 2020-12-31 RX ADMIN — BISOPROLOL FUMARATE 5 MG: 5 TABLET ORAL at 21:45

## 2020-12-31 RX ADMIN — BUPIVACAINE HYDROCHLORIDE 2 ML: 5 INJECTION, SOLUTION PERINEURAL at 07:40

## 2020-12-31 RX ADMIN — SODIUM CHLORIDE 1000 MG: 9 INJECTION, SOLUTION INTRAVENOUS at 08:03

## 2020-12-31 RX ADMIN — ROPIVACAINE HYDROCHLORIDE 20 ML: 2 INJECTION, SOLUTION EPIDURAL; INFILTRATION at 11:30

## 2020-12-31 RX ADMIN — VANCOMYCIN 1250 MG: 1.75 INJECTION, SOLUTION INTRAVENOUS at 18:55

## 2020-12-31 RX ADMIN — PROPOFOL 20 MG: 10 INJECTION, EMULSION INTRAVENOUS at 08:00

## 2020-12-31 RX ADMIN — ONDANSETRON 4 MG: 2 INJECTION, SOLUTION INTRAMUSCULAR; INTRAVENOUS at 07:14

## 2020-12-31 RX ADMIN — VANCOMYCIN 1500 MG: 1.5 INJECTION, SOLUTION INTRAVENOUS at 06:45

## 2020-12-31 NOTE — ANESTHESIA POSTPROCEDURE EVALUATION
Patient: Jaz Harley    Procedure Summary     Date: 12/31/20 Room / Location:  [facility] /  [facility]    Anesthesia Start: 0757 Anesthesia Stop: 0958    Procedure: TOTAL KNEE ARTHROPLASTY AND ALL ASSOCIATED PROCEDURES (Right Knee) Diagnosis:       Primary osteoarthritis of right knee      (Primary osteoarthritis of right knee [M17.11])    Surgeon: Sj Hannah MD Provider: Ronald Chambers CRNA    Anesthesia Type: spinal, MAC, regional ASA Status: 3          Anesthesia Type: spinal, MAC, regional    Vitals  Vitals Value Taken Time   /57 12/31/20 1150   Temp 98.2 °F (36.8 °C) 12/31/20 1127   Pulse 80 12/31/20 1157   Resp 15 12/31/20 1150   SpO2 96 % 12/31/20 1157   Vitals shown include unvalidated device data.        Post Anesthesia Care and Evaluation    Patient location during evaluation: PACU  Patient participation: complete - patient participated  Level of consciousness: awake and alert  Pain score: 0  Pain management: adequate  Airway patency: patent  Anesthetic complications: No anesthetic complications  PONV Status: none  Cardiovascular status: acceptable  Respiratory status: acceptable  Hydration status: acceptable

## 2020-12-31 NOTE — ANESTHESIA PROCEDURE NOTES
Peripheral Block      Patient reassessed immediately prior to procedure    Patient location during procedure: post-op  Start time: 12/31/2020 11:27 AM  Stop time: 12/31/2020 11:30 AM  Reason for block: at surgeon's request and post-op pain management  Performed by  CRNA: Edilson Lovelace CRNA  Preanesthetic Checklist  Completed: patient identified, site marked, surgical consent, pre-op evaluation, timeout performed, IV checked, risks and benefits discussed and monitors and equipment checked  Prep:  Pt Position: supine  Sterile barriers:cap, gloves, mask and washed/disinfected hands  Prep: ChloraPrep  Patient monitoring: blood pressure monitoring, continuous pulse oximetry and EKG  Procedure  Sedation:yes  Performed under: local infiltration  Guidance:ultrasound guided  ULTRASOUND INTERPRETATION. Using ultrasound guidance a 21 G gauge needle was placed in close proximity to the nerve, at which point, under ultrasound guidance anesthetic was injected in the area of the nerve and spread of the anesthesia was seen on ultrasound in close proximity thereto.  There were no abnormalities seen on ultrasound; a digital image was taken; and the patient tolerated the procedure with no complications. Images:still images obtained, printed/placed on chart    Laterality:right  Block Type:adductor canal block  Injection Technique:catheter  Needle Type:echogenic  Needle Gauge:21 G  Resistance on Injection: none    Medications Used: ropivacaine (NAROPIN) 0.2% injection, 20 mL  Med admintered at 12/31/2020 11:30 AM      Medications  Comment:50 mcg Precedex  2 mg PF Decadron    Post Assessment  Injection Assessment: negative aspiration for heme, no paresthesia on injection and incremental injection  Patient Tolerance:comfortable throughout block  Complications:no

## 2020-12-31 NOTE — ANESTHESIA PREPROCEDURE EVALUATION
Anesthesia Evaluation     Patient summary reviewed and Nursing notes reviewed   no history of anesthetic complications:  NPO Solid Status: > 8 hours  NPO Liquid Status: > 8 hours           Airway   Mallampati: II  TM distance: >3 FB  Neck ROM: limited  Possible difficult intubation  Dental      Pulmonary - normal exam    breath sounds clear to auscultation  (+) asthma (used inhaler this AM),sleep apnea on CPAP,   Cardiovascular - normal exam    Beta blocker given within 24 hours of surgery and Beta blocker not taken-may be given intraoperatively  Rhythm: regular  Rate: normal    (+) hypertension well controlled 2 medications or greater,       Neuro/Psych  (+) psychiatric history Anxiety,     CVA: unknown timeframe. no residual symptoms.  GI/Hepatic/Renal/Endo    (+) obesity,  GERD well controlled,  renal disease CRI, diabetes mellitus type 2 well controlled, thyroid problem hypothyroidism    Musculoskeletal     (+) neck stiffness,   Abdominal   (+) obese,    Substance History - negative use     OB/GYN          Other   arthritis,                      Anesthesia Plan    ASA 3     spinal, MAC and regional       Anesthetic plan, all risks, benefits, and alternatives have been provided, discussed and informed consent has been obtained with: patient.  Use of blood products discussed with patient  Consented to blood products.

## 2020-12-31 NOTE — ANESTHESIA PROCEDURE NOTES
Spinal Block      Patient location during procedure: pre-op  Indication:at surgeon's request and post-op pain management  Performed By  CRNA: Ronald Chambers CRNA  Preanesthetic Checklist  Completed: patient identified, surgical consent, pre-op evaluation, timeout performed, IV checked, risks and benefits discussed and monitors and equipment checked  Spinal Block Prep:  Patient Position:sitting  Sterile Tech:cap, gown, mask, gloves and sterile barriers  Prep:Betadine  Patient Monitoring:blood pressure monitoring, continuous pulse oximetry and EKG  Spinal Block Procedure  Approach:midline  Guidance:landmark technique and palpation technique  Location:L3-L4  Needle Type:Aarticke  Needle Gauge:27    Fluid Appearance:clear  Medications: bupivacaine (MARCAINE) injection 0.5%, 2 mL  Med Administered at 12/31/2020 7:40 AM   Post Assessment  Patient Tolerance:patient tolerated the procedure well with no apparent complications  Complications no

## 2020-12-31 NOTE — ANESTHESIA PROCEDURE NOTES
Peripheral Block      Patient location during procedure: pre-op  Reason for block: post-op pain management and MD/surgeon's request  Performed by  KAITLYN: Ronald Chambers CRNA  Preanesthetic Checklist  Completed: patient identified, site marked, surgical consent, pre-op evaluation, timeout performed, IV checked, risks and benefits discussed and monitors and equipment checked  Prep:  Sterile barriers:cap, gloves, gown, mask and sterile barriers  Prep: ChloraPrep  Patient monitoring: blood pressure monitoring, continuous pulse oximetry and EKG  Procedure  Sedation:yes  Performed under: spinal  Guidance:ultrasound guided  ULTRASOUND INTERPRETATION. Using ultrasound guidance a 21 G gauge needle was placed in close proximity to the nerve, at which point, under ultrasound guidance anesthetic was injected in the area of the nerve and spread of the anesthesia was seen on ultrasound in close proximity thereto.  There were no abnormalities seen on ultrasound; a digital image was taken; and the patient tolerated the procedure with no complications. Images:still images obtained    Block Type:iPack  Injection Technique:single-shot  Needle Type:echogenic  Needle Gauge:22 G  Resistance on Injection: none    Medications Used: ropivacaine (NAROPIN) injection 0.5 %, 15 mL      Post Assessment  Injection Assessment: negative aspiration for heme, no paresthesia on injection and incremental injection  Patient Tolerance:comfortable throughout block  Complications:no

## 2021-01-01 VITALS
TEMPERATURE: 97.5 F | OXYGEN SATURATION: 94 % | RESPIRATION RATE: 18 BRPM | HEART RATE: 88 BPM | SYSTOLIC BLOOD PRESSURE: 107 MMHG | WEIGHT: 194 LBS | BODY MASS INDEX: 38.09 KG/M2 | DIASTOLIC BLOOD PRESSURE: 69 MMHG | HEIGHT: 60 IN

## 2021-01-01 LAB
ANION GAP SERPL CALCULATED.3IONS-SCNC: 12.1 MMOL/L (ref 5–15)
BASOPHILS # BLD AUTO: 0.01 10*3/MM3 (ref 0–0.2)
BASOPHILS NFR BLD AUTO: 0.1 % (ref 0–1.5)
BUN SERPL-MCNC: 22 MG/DL (ref 8–23)
BUN/CREAT SERPL: 19.6 (ref 7–25)
CALCIUM SPEC-SCNC: 8.5 MG/DL (ref 8.6–10.5)
CHLORIDE SERPL-SCNC: 105 MMOL/L (ref 98–107)
CO2 SERPL-SCNC: 20.9 MMOL/L (ref 22–29)
CREAT SERPL-MCNC: 1.12 MG/DL (ref 0.57–1)
DEPRECATED RDW RBC AUTO: 42.3 FL (ref 37–54)
EOSINOPHIL # BLD AUTO: 0 10*3/MM3 (ref 0–0.4)
EOSINOPHIL NFR BLD AUTO: 0 % (ref 0.3–6.2)
ERYTHROCYTE [DISTWIDTH] IN BLOOD BY AUTOMATED COUNT: 13.9 % (ref 12.3–15.4)
GFR SERPL CREATININE-BSD FRML MDRD: 47 ML/MIN/1.73
GLUCOSE SERPL-MCNC: 383 MG/DL (ref 65–99)
HCT VFR BLD AUTO: 36.3 % (ref 34–46.6)
HGB BLD-MCNC: 11.7 G/DL (ref 12–15.9)
IMM GRANULOCYTES # BLD AUTO: 0.07 10*3/MM3 (ref 0–0.05)
IMM GRANULOCYTES NFR BLD AUTO: 0.7 % (ref 0–0.5)
LYMPHOCYTES # BLD AUTO: 0.81 10*3/MM3 (ref 0.7–3.1)
LYMPHOCYTES NFR BLD AUTO: 7.5 % (ref 19.6–45.3)
MCH RBC QN AUTO: 27 PG (ref 26.6–33)
MCHC RBC AUTO-ENTMCNC: 32.2 G/DL (ref 31.5–35.7)
MCV RBC AUTO: 83.6 FL (ref 79–97)
MONOCYTES # BLD AUTO: 0.64 10*3/MM3 (ref 0.1–0.9)
MONOCYTES NFR BLD AUTO: 6 % (ref 5–12)
NEUTROPHILS NFR BLD AUTO: 85.7 % (ref 42.7–76)
NEUTROPHILS NFR BLD AUTO: 9.21 10*3/MM3 (ref 1.7–7)
NRBC BLD AUTO-RTO: 0 /100 WBC (ref 0–0.2)
PLATELET # BLD AUTO: 255 10*3/MM3 (ref 140–450)
PMV BLD AUTO: 10.5 FL (ref 6–12)
POTASSIUM SERPL-SCNC: 4.2 MMOL/L (ref 3.5–5.2)
RBC # BLD AUTO: 4.34 10*6/MM3 (ref 3.77–5.28)
SODIUM SERPL-SCNC: 138 MMOL/L (ref 136–145)
WBC # BLD AUTO: 10.74 10*3/MM3 (ref 3.4–10.8)

## 2021-01-01 PROCEDURE — A9270 NON-COVERED ITEM OR SERVICE: HCPCS | Performed by: ORTHOPAEDIC SURGERY

## 2021-01-01 PROCEDURE — 85025 COMPLETE CBC W/AUTO DIFF WBC: CPT | Performed by: ORTHOPAEDIC SURGERY

## 2021-01-01 PROCEDURE — G0378 HOSPITAL OBSERVATION PER HR: HCPCS

## 2021-01-01 PROCEDURE — 63710000001 PANTOPRAZOLE 40 MG TABLET DELAYED-RELEASE: Performed by: ORTHOPAEDIC SURGERY

## 2021-01-01 PROCEDURE — 97535 SELF CARE MNGMENT TRAINING: CPT

## 2021-01-01 PROCEDURE — 99213 OFFICE O/P EST LOW 20 MIN: CPT | Performed by: INTERNAL MEDICINE

## 2021-01-01 PROCEDURE — 63710000001 PREGABALIN 75 MG CAPSULE: Performed by: ORTHOPAEDIC SURGERY

## 2021-01-01 PROCEDURE — 63710000001 LEVOTHYROXINE 50 MCG TABLET: Performed by: ORTHOPAEDIC SURGERY

## 2021-01-01 PROCEDURE — 63710000001 APIXABAN 2.5 MG TABLET: Performed by: ORTHOPAEDIC SURGERY

## 2021-01-01 PROCEDURE — 97110 THERAPEUTIC EXERCISES: CPT

## 2021-01-01 PROCEDURE — 63710000001 ACETAMINOPHEN 325 MG TABLET: Performed by: ORTHOPAEDIC SURGERY

## 2021-01-01 PROCEDURE — 80048 BASIC METABOLIC PNL TOTAL CA: CPT | Performed by: ORTHOPAEDIC SURGERY

## 2021-01-01 PROCEDURE — 97116 GAIT TRAINING THERAPY: CPT

## 2021-01-01 RX ORDER — ONDANSETRON 4 MG/1
4 TABLET, FILM COATED ORAL EVERY 8 HOURS PRN
Qty: 20 TABLET | Refills: 0 | Status: SHIPPED | OUTPATIENT
Start: 2021-01-01 | End: 2021-01-15

## 2021-01-01 RX ORDER — DOCUSATE SODIUM 250 MG
250 CAPSULE ORAL DAILY
Qty: 30 CAPSULE | Refills: 0 | Status: SHIPPED | OUTPATIENT
Start: 2021-01-01 | End: 2022-12-19

## 2021-01-01 RX ORDER — FOLIC ACID 0.8 MG
500 TABLET ORAL
Qty: 30 EACH | Refills: 0 | Status: SHIPPED | OUTPATIENT
Start: 2021-01-01 | End: 2022-12-19

## 2021-01-01 RX ORDER — OXYCODONE HYDROCHLORIDE AND ACETAMINOPHEN 5; 325 MG/1; MG/1
TABLET ORAL
Qty: 36 TABLET | Refills: 0 | Status: SHIPPED | OUTPATIENT
Start: 2021-01-01 | End: 2021-01-15

## 2021-01-01 RX ORDER — PREGABALIN 75 MG/1
75 CAPSULE ORAL EVERY 12 HOURS SCHEDULED
Qty: 28 CAPSULE | Refills: 0 | Status: SHIPPED | OUTPATIENT
Start: 2021-01-01 | End: 2021-01-15 | Stop reason: SDUPTHER

## 2021-01-01 RX ADMIN — PANTOPRAZOLE SODIUM 40 MG: 40 TABLET, DELAYED RELEASE ORAL at 06:21

## 2021-01-01 RX ADMIN — ACETAMINOPHEN 975 MG: 325 TABLET, FILM COATED ORAL at 09:05

## 2021-01-01 RX ADMIN — PREGABALIN 75 MG: 75 CAPSULE ORAL at 09:05

## 2021-01-01 RX ADMIN — APIXABAN 2.5 MG: 2.5 TABLET, FILM COATED ORAL at 09:05

## 2021-01-01 RX ADMIN — LEVOTHYROXINE SODIUM 50 MCG: 50 TABLET ORAL at 06:21

## 2021-01-01 NOTE — THERAPY TREATMENT NOTE
Acute Care - Physical Therapy Treatment Note  STEPHEN Quiles     Patient Name: Jaz Harley  : 1945  MRN: [id number]  Today's Date: 2021   Onset of Illness/Injury or Date of Surgery: 20       PT Assessment (last 12 hours)      PT Evaluation and Treatment     Row Name 21 0825          Physical Therapy Time and Intention    Subjective Information  no complaints  -KM     Document Type  therapy note (daily note)  -KM     Mode of Treatment  physical therapy  -KM     Patient Effort  good  -KM     Row Name 21 0825          Cognition    Personal Safety Interventions  fall prevention program maintained;gait belt;nonskid shoes/slippers when out of bed  -KM     Row Name 21 0825          Bed Mobility    Bed Mobility  supine-sit;sit-supine  -KM     Supine-Sit Cedar (Bed Mobility)  independent  -KM     Sit-Supine Cedar (Bed Mobility)  independent  -KM     Row Name 21 0825          Transfers    Transfers  sit-stand transfer;stand-sit transfer  -KM     Sit-Stand Cedar (Transfers)  standby assist  -KM     Stand-Sit Cedar (Transfers)  standby assist  -KM     Row Name 21 0825          Sit-Stand Transfer    Assistive Device (Sit-Stand Transfers)  walker, front-wheeled  -KM     Row Name 21 0825          Stand-Sit Transfer    Assistive Device (Stand-Sit Transfers)  walker, front-wheeled  -KM     Row Name 21 0825          Gait/Stairs (Locomotion)    Cedar Level (Gait)  standby assist  -KM     Assistive Device (Gait)  walker, front-wheeled  -KM     Distance in Feet (Gait)  20  -KM     Negotiation (Stairs)  ascending technique;descending technique  -KM     Cedar Level (Stairs)  contact guard;verbal cues  -KM     Handrail Location (Stairs)  none  -KM     Number of Steps (Stairs)  3  -KM     Ascending Technique (Stairs)  step-over-step  -KM     Descending Technique (Stairs)  step-over-step  -KM     Comment (Gait/Stairs)  No LOB noted  with turns. Stair training completed with CGA/SBA. Pt able to complete with handrails (B) sides and then use of no handrails like at home.   -KM     Row Name 01/01/21 0825          Knee (Therapeutic Exercise)    Knee (Therapeutic Exercise)  AROM (active range of motion);AAROM (active assistive range of motion);isometric exercises;strengthening exercise  -KM     Knee AROM (Therapeutic Exercise)  right;SLR (straight leg raise);LAQ (long arc quad);heel slides;10 repetitions  -KM     Knee Isometrics (Therapeutic Exercise)  right;quad sets;10 repetitions  -KM     Row Name             Wound 12/31/20 0831 Right anterior knee Incision    Wound - Properties Group Placement Date: 12/31/20  -ME Placement Time: 0831  -ME Present on Hospital Admission: N  -ME Side: Right  -ME Orientation: anterior  -ME Location: knee  -ME Primary Wound Type: Incision  -ME Additional Comments: prineo, telfa, ABD pads, cast padding, ace wraps, thermal wrap, immobilizer  -ME    Retired Wound - Properties Group Date first assessed: 12/31/20  -ME Time first assessed: 0831  -ME Present on Hospital Admission: N  -ME Side: Right  -ME Location: knee  -ME Primary Wound Type: Incision  -ME Additional Comments: prineo, telfa, ABD pads, cast padding, ace wraps, thermal wrap, immobilizer  -ME    Row Name 01/01/21 0825          Plan of Care Review    Plan of Care Reviewed With  patient  -KM     Progress  improving  -KM     Outcome Summary  PT: Pt states her knee is doing well and is not experiencing any pain. Pt has met goals set by PT with the ability to complete mobility and transfers independent/SBA. Stair training completed 3 steps x 2 SBA/CGA; first set complete with (B) hand rails, second set completed without handrails to replicate at home. HEP x 10 reps completed; reviewed and provided written HEP. Pt does not have any questions concerns at this time and plans to be discharged home with home health.   -KM     Row Name 01/01/21 0825          Positioning  and Restraints    Pre-Treatment Position  in bed  -KM     Post Treatment Position  bed  -KM     In Bed  supine;call light within reach;encouraged to call for assist  -KM       User Key  (r) = Recorded By, (t) = Taken By, (c) = Cosigned By    Initials Name Provider Type     Sandie Arzate PTA Physical Therapy Assistant    Blaire Bagley, RN Registered Nurse        Physical Therapy Education                 Title: PT OT SLP Therapies (Done)     Topic: Physical Therapy (Done)     Point: Mobility training (Done)     Learning Progress Summary           Patient Acceptance, E, VU,DU by  at 1/1/2021 1047    Comment: stair training completed.HEP x 10 reps completed. Reviewed and provided written HEP.    Acceptance, E,TB, VU by  at 12/31/2020 1404                   Point: Precautions (Done)     Learning Progress Summary           Patient Acceptance, E, VU,DU by  at 1/1/2021 1047    Comment: stair training completed.HEP x 10 reps completed. Reviewed and provided written HEP.    Acceptance, E,TB, VU by  at 12/31/2020 1404                               User Key     Initials Effective Dates Name Provider Type Discipline     06/08/18 -  Dianelys Almaraz, PT Physical Therapist PT     02/05/19 -  Sandie Arzate PTA Physical Therapy Assistant PT              PT Recommendation and Plan     Plan of Care Reviewed With: patient  Progress: improving  Outcome Summary: PT: Pt states her knee is doing well and is not experiencing any pain. Pt has met goals set by PT with the ability to complete mobility and transfers independent/SBA. Stair training completed 3 steps x 2 SBA/CGA; first set complete with (B) hand rails, second set completed without handrails to replicate at home. HEP x 10 reps completed; reviewed and provided written HEP. Pt does not have any questions concerns at this time and plans to be discharged home with home health.   Outcome Measures     Row Name 01/01/21 0825 12/31/20 1404          How much help  from another person do you currently need...    Turning from your back to your side while in flat bed without using bedrails?  4  -KM  3  -LH     Moving from lying on back to sitting on the side of a flat bed without bedrails?  4  -KM  3  -LH     Moving to and from a bed to a chair (including a wheelchair)?  4  -KM  3  -LH     Standing up from a chair using your arms (e.g., wheelchair, bedside chair)?  4  -KM  3  -LH     Climbing 3-5 steps with a railing?  3  -KM  3  -LH     To walk in hospital room?  4  -KM  3  -LH     AM-PAC 6 Clicks Score (PT)  23  -KM  18  -LH        Functional Assessment    Outcome Measure Options  AM-PAC 6 Clicks Basic Mobility (PT)  -KM  AM-PAC 6 Clicks Basic Mobility (PT)  -LH       User Key  (r) = Recorded By, (t) = Taken By, (c) = Cosigned By    Initials Name Provider Type     Dianelys Almaraz, PT Physical Therapist    Sandie Holder PTA Physical Therapy Assistant           Time Calculation:   PT Charges     Row Name 01/01/21 1049             Time Calculation    Start Time  0825  -KM      Stop Time  0900  -KM      Time Calculation (min)  35 min  -KM        User Key  (r) = Recorded By, (t) = Taken By, (c) = Cosigned By    Initials Name Provider Type    Sandie Holder PTA Physical Therapy Assistant        Therapy Charges for Today     Code Description Service Date Service Provider Modifiers Qty    20805549332 HC GAIT TRAINING EA 15 MIN 1/1/2021 Sandie Arzate PTA GP 1    40751594681 HC PT THER PROC EA 15 MIN 1/1/2021 Sandie Arzate PTA GP 1          PT G-Codes  Outcome Measure Options: AM-PAC 6 Clicks Basic Mobility (PT)  AM-PAC 6 Clicks Score (PT): 23  AM-PAC 6 Clicks Score (OT): 21    Sandie Arzate PTA  1/1/2021

## 2021-01-01 NOTE — PLAN OF CARE
Goal Outcome Evaluation:  Plan of Care Reviewed With: patient  Progress: improving  Outcome Summary: VSS, rested well, good pain control, ambulates well with walker and assist x1

## 2021-01-01 NOTE — DISCHARGE SUMMARY
Orthopedic Discharge Summary      Patient: Jaz Harley      YOB: 1945    Medical Record Number: [id number]    Attending Physician: Sj Hannah MD  Consulting Physician(s):   Date of Admission: 12/31/2020  6:07 AM  Date of Discharge: 01/01/2021      Patient Active Problem List   Diagnosis   • Tear of medial meniscus of right knee, current   • Stress fracture of right tibia   • Primary osteoarthritis of right knee   • Aortic valve sclerosis   • Benign essential HTN   • Thyroid disease   • Fibromyalgia   • GERD (gastroesophageal reflux disease)   • Hearing loss in left ear   • Hyperlipidemia   • Obesity   • Obstructive sleep apnea   • Pericardial effusion   • Tricuspid regurgitation     Status Post: [facility] TOTAL KNEE ARTHROPLASTY [99949] (TOTAL KNEE ARTHROPLASTY AND ALL ASSOCIATED PROCEDURES), RIGHT       Allergies   Allergen Reactions   • Penicillins Hives   • Sulfa Antibiotics Rash   • Trimethoprim Hives   • Ibuprofen Other (See Comments)     Stage 3 kidney disease   • Diazepam Rash       Current Medications:     Discharge Medications      New Medications      Instructions Start Date   docusate sodium 250 MG capsule  Commonly known as: COLACE   250 mg, Oral, Daily      elastomeric 8 mL/hr reservoir 1 each device 1 Device, sodium chloride 0.9 % solution 300 mL with bupivacaine (PF) 0.5 % solution 100 mL   8 mL/hr (8 mL/hr), Intradermal, Continuous      Magnesium 500 MG capsule   500 mg, Oral, Every Night at Bedtime      Melatonin 5 MG capsule   5 mg, Oral, Every Night at Bedtime      ondansetron 4 MG tablet  Commonly known as: ZOFRAN   4 mg, Oral, Every 8 Hours PRN      oxyCODONE-acetaminophen 5-325 MG per tablet  Commonly known as: PERCOCET   1-2 tablets every 4-6 hours, prn moderate-severe pain      pregabalin 75 MG capsule  Commonly known as: LYRICA   75 mg, Oral, Every 12 Hours Scheduled      rivaroxaban 10 MG tablet  Commonly known as: Xarelto   10 mg, Oral, Daily         Continue  These Medications      Instructions Start Date   albuterol sulfate  (90 Base) MCG/ACT inhaler  Commonly known as: PROVENTIL HFA;VENTOLIN HFA;PROAIR HFA   2 puffs, Inhalation      atorvastatin 20 MG tablet  Commonly known as: LIPITOR   20 mg, Oral, Nightly      bisoprolol 5 MG tablet  Commonly known as: ZEBeta   5 mg, Oral, Nightly      CBD oral oil  Commonly known as: cannabidiol   Oral      fexofenadine 180 MG tablet  Commonly known as: ALLEGRA   180 mg, Oral, Daily PRN      glyburide 5 MG tablet  Commonly known as: DIAbeta   5 mg, Oral, 2 times daily      levothyroxine 50 MCG tablet  Commonly known as: SYNTHROID, LEVOTHROID   50 mcg, Oral, Every Morning      meclizine 25 MG tablet  Commonly known as: ANTIVERT   25 mg, Oral, 3 Times Daily PRN      metFORMIN 500 MG tablet  Commonly known as: GLUCOPHAGE   500 mg, Oral, 2 Times Daily      nitroglycerin 0.4 MG SL tablet  Commonly known as: NITROSTAT   0.4 mg, Sublingual      omeprazole 40 MG capsule  Commonly known as: priLOSEC   20 mg, Oral, Every Morning      phenazopyridine 100 MG tablet  Commonly known as: PYRIDIUM   100 mg, Oral, 3 Times Daily PRN      sulfacetamide 10 % ophthalmic solution  Commonly known as: BLEPH-10   1 drop, Both Eyes, Daily PRN      triamterene-hydrochlorothiazide 37.5-25 MG per tablet  Commonly known as: MAXZIDE-25   1 tablet, Oral, Daily      Trulicity 0.75 MG/0.5ML solution pen-injector  Generic drug: Dulaglutide   1.5 mg, Subcutaneous, Weekly, On  wednesdays      Vitamin D3 25 MCG (1000 UT) capsule   4,000 Units, Oral, Every Morning                 Past Medical History:   Diagnosis Date   • Anxiety    • Arthritis     knees, hands   • Asthma    • Diabetes (CMS/[facility])     last a1c 6.1   • Fibromyalgia    • Fracture, radius    • GERD (gastroesophageal reflux disease)    • Hyperlipidemia    • Hypertension    • Hyperthyroidism    • Right knee pain     scheduled for TKA   • Sleep apnea     uses bipap   • Stage 3 chronic kidney disease    •  Stroke (CMS/HCC)     found on ct scan, no residuals      Past Surgical History:   Procedure Laterality Date   • APPENDECTOMY     • BLEPHAROPLASTY     • CARPAL TUNNEL RELEASE Right    • CERVICAL DISCECTOMY ANTERIOR     • HYSTERECTOMY      abd   • MASTOIDECTOMY       Social History     Occupational History   • Not on file   Tobacco Use   • Smoking status: Former Smoker     Quit date:      Years since quittin.0   • Smokeless tobacco: Never Used   Substance and Sexual Activity   • Alcohol use: No     Frequency: Never   • Drug use: Never   • Sexual activity: Defer      Social History     Social History Narrative   • Not on file     Family History   Problem Relation Age of Onset   • Cancer Mother    • Cancer Father    • Diabetes Sister    • Cancer Sister          Physical Exam: 75 y.o. female  General Appearance:    Alert, cooperative, in no acute distress                      Vitals:    20 1954 20 2145 20 2346 21 0650   BP: 113/69 105/70 109/69 107/69   BP Location: Left arm  Right arm Right arm   Patient Position: Lying  Lying Lying   Pulse: 99 96 93 88   Resp: 18  18 18   Temp: 98 °F (36.7 °C)  97.3 °F (36.3 °C) 97.5 °F (36.4 °C)   TempSrc: Oral  Axillary Oral   SpO2: 95%  91% 94%   Weight:       Height:            Head:    Normocephalic, without obvious abnormality, atraumatic   Eyes:            Lids and lashes normal, conjunctivae and sclerae normal, no   icterus, no pallor, corneas clear, PERRLA   Ears:    Ears appear intact with no abnormalities noted   Throat:   No oral lesions, no thrush, oral mucosa moist   Neck:   No adenopathy, supple, trachea midline, no thyromegaly, no    carotid bruit, no JVD   Back:     No kyphosis present, no scoliosis present, no skin lesions,       erythema or scars, no tenderness to percussion or                   palpation,   range of motion normal   Lungs:     Clear to auscultation,respirations regular, even and                   unlabored    Heart:     Regular rhythm and normal rate, normal S1 and S2, no            murmur, no gallop, no rub, no click   Chest Wall:    No abnormalities observed   Abdomen:     Normal bowel sounds, no masses, no organomegaly, soft        non-tender, non-distended, no guarding, no rebound                 tenderness   Rectal:     Deferred   Extremities:   Incision intact without signs or symptoms of infection.               Neurovascular status remains intact to operative extremity.      Moves all extremities well, no edema, no cyanosis, no              redness   Pulses:   Pulses palpable and equal bilaterally   Skin:   No bleeding, bruising or rash   Lymph nodes:   No palpable adenopathy   Neurologic:   Cranial nerves 2 - 12 grossly intact, sensation intact, DTR        present and equal bilaterally           Hospital Course:  75 y.o. female admitted to [facility] to services of Sj Hannah MD with Primary osteoarthritis of right knee [M17.11]  Primary osteoarthritis of right knee [M17.11] on 12/31/2020 and underwent [place] TOTAL KNEE ARTHROPLASTY [14727] (TOTAL KNEE ARTHROPLASTY AND ALL ASSOCIATED PROCEDURES)  Per Sj Hannah MD. Antibiotic and VTE prophylaxis were per SCIP protocols. Post-operatively the patient transferred to the post-operative floor where the patient underwent mobilization therapy that included active as well as passive ROM exercises. Opioids were titrated to achieve appropriate pain management to allow for participation in mobilization exercises. Vital signs are now stable. The incision is intact without signs or symptoms of infection. Operative extremity neurovascular status remains intact.   Appropriate education re: incision care, activity levels, medications, and follow up visits was completed and all questions were answered. The patient is now deemed stable for discharge to Home.      DIAGNOSTIC TESTS:     Admission on 12/31/2020   Component Date Value Ref Range Status   • ABO Type 12/31/2020  O   Final   • RH type 12/31/2020 Negative   Final   • Antibody Screen 12/31/2020 Negative   Final   • T&S Expiration Date 12/31/2020 1/3/2021 11:59:59 PM   Final   • Potassium 12/31/2020 3.9  3.5 - 5.2 mmol/L Final   • Glucose 12/31/2020 126  70 - 130 mg/dL Final   • ABO Type 12/31/2020 O   Final   • RH type 12/31/2020 Negative   Final   • Creatinine 12/31/2020 1.22* 0.57 - 1.00 mg/dL Final   • eGFR Non African Amer 12/31/2020 43* >60 mL/min/1.73 Final   • BUN 12/31/2020 21  8 - 23 mg/dL Final   • Glucose 01/01/2021 383* 65 - 99 mg/dL Final   • BUN 01/01/2021 22  8 - 23 mg/dL Final   • Creatinine 01/01/2021 1.12* 0.57 - 1.00 mg/dL Final   • Sodium 01/01/2021 138  136 - 145 mmol/L Final   • Potassium 01/01/2021 4.2  3.5 - 5.2 mmol/L Final   • Chloride 01/01/2021 105  98 - 107 mmol/L Final   • CO2 01/01/2021 20.9* 22.0 - 29.0 mmol/L Final   • Calcium 01/01/2021 8.5* 8.6 - 10.5 mg/dL Final   • eGFR Non African Amer 01/01/2021 47* >60 mL/min/1.73 Final   • BUN/Creatinine Ratio 01/01/2021 19.6  7.0 - 25.0 Final   • Anion Gap 01/01/2021 12.1  5.0 - 15.0 mmol/L Final   • WBC 01/01/2021 10.74  3.40 - 10.80 10*3/mm3 Final   • RBC 01/01/2021 4.34  3.77 - 5.28 10*6/mm3 Final   • Hemoglobin 01/01/2021 11.7* 12.0 - 15.9 g/dL Final   • Hematocrit 01/01/2021 36.3  34.0 - 46.6 % Final   • MCV 01/01/2021 83.6  79.0 - 97.0 fL Final   • MCH 01/01/2021 27.0  26.6 - 33.0 pg Final   • MCHC 01/01/2021 32.2  31.5 - 35.7 g/dL Final   • RDW 01/01/2021 13.9  12.3 - 15.4 % Final   • RDW-SD 01/01/2021 42.3  37.0 - 54.0 fl Final   • MPV 01/01/2021 10.5  6.0 - 12.0 fL Final   • Platelets 01/01/2021 255  140 - 450 10*3/mm3 Final   • Neutrophil % 01/01/2021 85.7* 42.7 - 76.0 % Final   • Lymphocyte % 01/01/2021 7.5* 19.6 - 45.3 % Final   • Monocyte % 01/01/2021 6.0  5.0 - 12.0 % Final   • Eosinophil % 01/01/2021 0.0* 0.3 - 6.2 % Final   • Basophil % 01/01/2021 0.1  0.0 - 1.5 % Final   • Immature Grans % 01/01/2021 0.7* 0.0 - 0.5 % Final   •  Neutrophils, Absolute 01/01/2021 9.21* 1.70 - 7.00 10*3/mm3 Final   • Lymphocytes, Absolute 01/01/2021 0.81  0.70 - 3.10 10*3/mm3 Final   • Monocytes, Absolute 01/01/2021 0.64  0.10 - 0.90 10*3/mm3 Final   • Eosinophils, Absolute 01/01/2021 0.00  0.00 - 0.40 10*3/mm3 Final   • Basophils, Absolute 01/01/2021 0.01  0.00 - 0.20 10*3/mm3 Final   • Immature Grans, Absolute 01/01/2021 0.07* 0.00 - 0.05 10*3/mm3 Final   • nRBC 01/01/2021 0.0  0.0 - 0.2 /100 WBC Final       No results found.    Discharge and Follow up Instructions:   Weightbearing as tolerated right lower extremity  Home health physical therapy  Follow-up in office in 2 weeks  Follow-up with PCP 1-2 weeks post-op  Xarelto DVT prophylaxis       Date: 1/1/2021    ARMIDA Abbott

## 2021-01-01 NOTE — PROGRESS NOTES
"SERVICE: [facility] HOSPITALIST    CHIEF COMPLAINT: Vertigo    SUBJECTIVE:    Patient's vertigo is mild today, she does not feel it is interfering with her transfers or ambulation.  Bladder scans overnight were ~250 cc.  Discussed with her and her daughter that the meclizine may be worsening her residual volumes, and that with her recent urinary tract infection, her meclizine may be causing her to retain enough urine that she will be more prone to UTIs.  Patient stated that she will not use meclizine, except when her vertigo is very severe.    Informed patient that if her vertigo improved with intervention from her chiropractor last time, would recommend continuing with that.  She stated that when this vertigo restarted 7 weeks ago it was very severe, but it has been improving since.    Otherwise patient has not required any pain medication other than Tylenol, and states that the pain from her knee postop is much improved over her preoperative pain.    Review of systems is negative for fever, chills, vomiting or diarrhea    OBJECTIVE:    /69 (BP Location: Right arm, Patient Position: Lying)   Pulse 88   Temp 97.5 °F (36.4 °C) (Oral)   Resp 18   Ht 152.4 cm (60\")   Wt 88 kg (194 lb)   SpO2 94%   BMI 37.89 kg/m²     MEDS/LABS REVIEWED AND ORDERED    acetaminophen, 975 mg, Oral, TID  apixaban, 2.5 mg, Oral, Q12H  atorvastatin, 20 mg, Oral, Nightly  bisoprolol, 5 mg, Oral, Nightly  levothyroxine, 50 mcg, Oral, QAM  pantoprazole, 40 mg, Oral, QAM  pregabalin, 75 mg, Oral, Q12H        Physical Exam  Vitals signs and nursing note reviewed.   Constitutional:       General: She is not in acute distress.     Appearance: She is obese. She is not ill-appearing, toxic-appearing or diaphoretic.   Cardiovascular:      Rate and Rhythm: Normal rate and regular rhythm.      Heart sounds: No murmur.   Pulmonary:      Effort: Pulmonary effort is normal. No respiratory distress.      Breath sounds: No " wheezing, rhonchi or rales.   Abdominal:      General: Abdomen is flat. Bowel sounds are normal. There is no distension.      Tenderness: There is no abdominal tenderness. There is no guarding.   Skin:     General: Skin is warm and dry.   Neurological:      General: No focal deficit present.      Mental Status: She is alert and oriented to person, place, and time. Mental status is at baseline.   Psychiatric:         Mood and Affect: Mood normal.         Behavior: Behavior normal.         LAB/DIAGNOSTICS:    Lab Results (last 24 hours)     Procedure Component Value Units Date/Time    Basic Metabolic Panel [[id number]]  (Abnormal) Collected: 01/01/21 0508    Specimen: Blood Updated: 01/01/21 0618     Glucose 383 mg/dL      BUN 22 mg/dL      Creatinine 1.12 mg/dL      Sodium 138 mmol/L      Potassium 4.2 mmol/L      Chloride 105 mmol/L      CO2 20.9 mmol/L      Calcium 8.5 mg/dL      eGFR Non African Amer 47 mL/min/1.73      BUN/Creatinine Ratio 19.6     Anion Gap 12.1 mmol/L     Narrative:      GFR Normal >60  Chronic Kidney Disease <60  Kidney Failure <15      CBC & Differential [[id number]]  (Abnormal) Collected: 01/01/21 0508    Specimen: Blood Updated: 01/01/21 0557    Narrative:      The following orders were created for panel order CBC & Differential.  Procedure                               Abnormality         Status                     ---------                               -----------         ------                     CBC Auto Differential[[id number]]        Abnormal            Final result                 Please view results for these tests on the individual orders.    CBC Auto Differential [[id number]]  (Abnormal) Collected: 01/01/21 0508    Specimen: Blood Updated: 01/01/21 0557     WBC 10.74 10*3/mm3      RBC 4.34 10*6/mm3      Hemoglobin 11.7 g/dL      Hematocrit 36.3 %      MCV 83.6 fL      MCH 27.0 pg      MCHC 32.2 g/dL      RDW 13.9 %      RDW-SD 42.3 fl      MPV 10.5 fL      Platelets 255 10*3/mm3       Neutrophil % 85.7 %      Lymphocyte % 7.5 %      Monocyte % 6.0 %      Eosinophil % 0.0 %      Basophil % 0.1 %      Immature Grans % 0.7 %      Neutrophils, Absolute 9.21 10*3/mm3      Lymphocytes, Absolute 0.81 10*3/mm3      Monocytes, Absolute 0.64 10*3/mm3      Eosinophils, Absolute 0.00 10*3/mm3      Basophils, Absolute 0.01 10*3/mm3      Immature Grans, Absolute 0.07 10*3/mm3      nRBC 0.0 /100 WBC     Creatinine, Serum [[id number]]  (Abnormal) Collected: 12/31/20 0641    Specimen: Blood Updated: 12/31/20 1701     Creatinine 1.22 mg/dL      eGFR Non African Amer 43 mL/min/1.73     Narrative:      GFR Normal >60  Chronic Kidney Disease <60  Kidney Failure <15      BUN [[id number]]  (Normal) Collected: 12/31/20 0641    Specimen: Blood Updated: 12/31/20 1701     BUN 21 mg/dL            Xr Knee 1 Or 2 View Right    Result Date: 12/31/2020  Satisfactory postoperative appearance following right total knee arthroplasty.  This report was finalized on 12/31/2020 10:45 AM by Dr. Josesito Leslie MD.          ASSESSMENT/PLAN:    Chronic Vertigo  - h/o CVA, unknown what location  -Post void residuals were borderline, in the 200s  -Discussed the risks versus benefits of meclizine use, patient demonstrated understanding  -Recommend follow-up with her primary care provider, and chiropractor    DM2  - A1c 7.2  - BG's well controlled on checks  -Resume metformin & glyberide and weekly trulicity on DC    DANUTA  - Allow CPAP when able, otherwise monitor on capnography     Essential HTN  - BP's 126's, on zebeta 5mg, restart maxide on DC     CKD3  -Creatinine within normal limits     Hypothyroidism  - On her home levothyroxine    Right knee osteoarthritis now status post TKA  -Doing well, pain is actually better controlled than preop    Medically stable for discharge per Ortho

## 2021-01-01 NOTE — PLAN OF CARE
Goal Outcome Evaluation:  Plan of Care Reviewed With: patient  Progress: improving  Outcome Summary: patient post op rt total knee, no pain noted today, ambulating to bathroom with assist x1. Doing very well & anticipate home tomorrow. Mild dizziness when ambulating

## 2021-01-01 NOTE — PLAN OF CARE
Goal Outcome Evaluation:  Plan of Care Reviewed With: patient  Progress: improving  Outcome Summary: OT- Patient performed bed mobility with supervision. Patient sat at EOB and donned socks independently without equipment. Patient reports she plans to return home with family today and will have home health therapy. Patient reports no concerns regarding ADLs at this time.

## 2021-01-01 NOTE — THERAPY TREATMENT NOTE
Patient Name: Jaz Harley  : 1945    MRN: [id number]                              Today's Date: 2021       Admit Date: 2020    Visit Dx:     ICD-10-CM ICD-9-CM   1. Status post total right knee replacement  Z96.651 V43.65   2. Primary osteoarthritis of right knee  M17.11 715.16     Patient Active Problem List   Diagnosis   • Tear of medial meniscus of right knee, current   • Stress fracture of right tibia   • Primary osteoarthritis of right knee   • Aortic valve sclerosis   • Benign essential HTN   • Thyroid disease   • Fibromyalgia   • GERD (gastroesophageal reflux disease)   • Hearing loss in left ear   • Hyperlipidemia   • Obesity   • Obstructive sleep apnea   • Pericardial effusion   • Tricuspid regurgitation     Past Medical History:   Diagnosis Date   • Anxiety    • Arthritis     knees, hands   • Asthma    • Diabetes (CMS/HCC)     last a1c 6.1   • Fibromyalgia    • Fracture, radius    • GERD (gastroesophageal reflux disease)    • Hyperlipidemia    • Hypertension    • Hyperthyroidism    • Right knee pain     scheduled for TKA   • Sleep apnea     uses bipap   • Stage 3 chronic kidney disease    • Stroke (CMS/HCC)     found on ct scan, no residuals      Past Surgical History:   Procedure Laterality Date   • APPENDECTOMY     • BLEPHAROPLASTY     • CARPAL TUNNEL RELEASE Right    • CERVICAL DISCECTOMY ANTERIOR     • HYSTERECTOMY      abd   • MASTOIDECTOMY       General Information     Row Name 21 1149          OT Time and Intention    Document Type  therapy note (daily note)  -EN     Mode of Treatment  occupational therapy  -EN       User Key  (r) = Recorded By, (t) = Taken By, (c) = Cosigned By    Initials Name Provider Type    Marleen Jaramillo OTR Occupational Therapist          Mobility/ADL's     Row Name 21 1149          Bed Mobility    Bed Mobility  bed mobility (all) activities  -EN     All Activities, [place] (Bed Mobility)  supervision  -EN     Assistive  Device (Bed Mobility)  head of bed elevated  -EN     Row Name 01/01/21 1149          Lower Body Dressing Assessment/Training    [place] Level (Lower Body Dressing)  don;socks;independent  -EN     Position (Lower Body Dressing)  edge of bed sitting  -EN       User Key  (r) = Recorded By, (t) = Taken By, (c) = Cosigned By    Initials Name Provider Type    Marleen Jaramillo, NEMESIO Occupational Therapist        Obj/Interventions    No documentation.       Goals/Plan     Row Name 01/01/21 1152          Dressing Goal 1 (OT)    Progress/Outcome (Dressing Goal 1, OT)  good progress toward goal  -EN       User Key  (r) = Recorded By, (t) = Taken By, (c) = Cosigned By    Initials Name Provider Type    Marleen Jaramillo OTR Occupational Therapist        Clinical Impression     Row Name 01/01/21 1150          Pain Assessment    Additional Documentation  Pain Scale: Numbers Pre/Post-Treatment (Group)  -EN     Row Name 01/01/21 1150          Pain Scale: Numbers Pre/Post-Treatment    Pretreatment Pain Rating  -- did not c/o pain  -EN     Row Name 01/01/21 1150          Plan of Care Review    Plan of Care Reviewed With  patient  -EN     Progress  improving  -EN     Outcome Summary  OT- Patient performed bed mobility with supervision. Patient sat at EOB and donned socks independently without equipment. Patient reports she plans to return home with family today and will have home health therapy. Patient reports no concerns regarding ADLs at this time.  -EN     Row Name 01/01/21 1150          Therapy Assessment/Plan (OT)    Rehab Potential (OT)  good, to achieve stated therapy goals  -EN     Row Name 01/01/21 1150          Positioning and Restraints    In Bed  notified nsg;supine;call light within reach;encouraged to call for assist  -EN       User Key  (r) = Recorded By, (t) = Taken By, (c) = Cosigned By    Initials Name Provider Type    Marleen Jaramillo OTR Occupational Therapist        Outcome Measures      Row Name 01/01/21 1153          How much help from another is currently needed...    Putting on and taking off regular lower body clothing?  3  -EN     Bathing (including washing, rinsing, and drying)  3  -EN     Toileting (which includes using toilet bed pan or urinal)  3  -EN     Putting on and taking off regular upper body clothing  4  -EN     Taking care of personal grooming (such as brushing teeth)  4  -EN     Eating meals  4  -EN     AM-PAC 6 Clicks Score (OT)  21  -EN       User Key  (r) = Recorded By, (t) = Taken By, (c) = Cosigned By    Initials Name Provider Type    EN Marleen Lema OTR Occupational Therapist        Occupational Therapy Education                 Title: PT OT SLP Therapies (Done)     Topic: Occupational Therapy (Done)     Point: ADL training (Done)     Description:   Instruct learner(s) on proper safety adaptation and remediation techniques during self care or transfers.   Instruct in proper use of assistive devices.              Learning Progress Summary           Patient Acceptance, E, VU by EN at 1/1/2021 1153    Comment: home safety, adl ed.    Acceptance, E, VU,DU by KM at 1/1/2021 1047    Comment: stair training completed.HEP x 10 reps completed. Reviewed and provided written HEP.    Acceptance, E, VU by SD at 12/31/2020 1401    Comment: Education regarding OT services, safety with transfer/mobility and impact of TKA on basic adl tasks. Pt can benefit from continued education regarding compensatory strategies for adl tasks.   Family Acceptance, E, VU by SD at 12/31/2020 1401    Comment: Education regarding OT services, safety with transfer/mobility and impact of TKA on basic adl tasks. Pt can benefit from continued education regarding compensatory strategies for adl tasks.                               User Key     Initials Effective Dates Name Provider Type Discipline    EN 07/05/20 -  Marleen Lema OTR Occupational Therapist OT    SD 07/05/20 -  Ethan Coello  OTR Occupational Therapist OT    KM 02/05/19 -  Sandie Arzate PTA Physical Therapy Assistant PT              OT Recommendation and Plan     Plan of Care Review  Plan of Care Reviewed With: patient  Progress: improving  Outcome Summary: OT- Patient performed bed mobility with supervision. Patient sat at EOB and donned socks independently without equipment. Patient reports she plans to return home with family today and will have home health therapy. Patient reports no concerns regarding ADLs at this time.     Time Calculation:   Time Calculation- OT     Row Name 01/01/21 1154             Time Calculation- OT    OT Start Time  1027  -EN      OT Stop Time  1035  -EN      OT Time Calculation (min)  8 min  -EN         Timed Charges    16783 - OT Self Care/Mgmt Minutes  8  -EN        User Key  (r) = Recorded By, (t) = Taken By, (c) = Cosigned By    Initials Name Provider Type    Marleen Jaramillo OTR Occupational Therapist        Therapy Charges for Today     Code Description Service Date Service Provider Modifiers Qty    47236225425 HC OT SELF CARE/MGMT/TRAIN EA 15 MIN 1/1/2021 Marleen Lema OTR GO 1               NEMESIO Perez  1/1/2021

## 2021-01-01 NOTE — NURSING NOTE
Case Management Discharge Note      Final Note: Discharged home with [facility] to follow.    Provided Post Acute Provider List?: Yes  Post Acute Provider List: Home Health  Provided Post Acute Provider Quality & Resource List?: Yes  Post Acute Provider Quality and Resource List: Home Health  Delivered To: Patient, Support Person  Support Person: Kathy Ventura, daughter  Method of Delivery: In person    Selected Continued Care - Discharged on 1/1/2021 Admission date: 12/31/2020 - Discharge disposition: Home or Self Care    Destination    No services have been selected for the patient.              Durable Medical Equipment Coordination complete    Service Provider Selected Services Address Phone Fax Patient Preferred    [facility]  Durable Medical Equipment [address] [id number] [phone] --          Dialysis/Infusion    No services have been selected for the patient.              Home Medical Care Coordination complete    Service Provider Selected Services Address Phone Fax Patient Preferred    [facility] - [facility] [address] [place] [id number] [phone] --          Therapy    No services have been selected for the patient.              Community Resources    No services have been selected for the patient.                       Final Discharge Disposition Code: 06 - home with home health care

## 2021-01-01 NOTE — NURSING NOTE
"Continued Stay Note  STEPHEN Quiles     Patient Name: Jaz Harley  MRN: [id number]  Today's Date: 1/1/2021    Admit Date: 12/31/2020    Discharge Plan     Row Name 01/01/21 1049       Plan    Plan  Home with [facility] to follow for PT/OT    Patient/Family in Agreement with Plan  yes    Plan Comments  Followed up with patient today and delivered her BSC and rolling walker. Patient is currently sitting up in bed with no complaints. States she still plans on returning home and has family who can help 24/7 and [facility] to follow for PT/OT. Spoke with patient's daughter Kathy regarding cost of the Xarelto being 363.47 and she states \"if mom needs it they we can afford to pay for it\". Patient had no other questions or concerns regarding discharge plans at this time. CM will continue to follow for needs        Discharge Codes    No documentation.             Cindi Moura RN    "

## 2021-01-01 NOTE — PLAN OF CARE
Goal Outcome Evaluation:  Plan of Care Reviewed With: patient  Progress: improving  Outcome Summary: PT: Pt states her knee is doing well and is not experiencing any pain. Pt has met goals set by PT with the ability to complete mobility and transfers independent/SBA. Stair training completed 3 steps x 2 SBA/CGA; first set complete with (B) hand rails, second set completed without handrails to replicate at home. HEP x 10 reps completed; reviewed and provided written HEP. Pt does not have any questions concerns at this time and plans to be discharged home with home health.

## 2021-01-01 NOTE — PROGRESS NOTES
POD# 1 s/p right TKA    Subjective:Patient states doing well at this point in time and ready to go home, denies fevers chills or sweats overnight, denies any residual numbness or tingling to operative extremity.  No calf pain or swelling.    Objective:  Vitals:    12/31/20 1954 12/31/20 2145 12/31/20 2346 01/01/21 0650   BP: 113/69 105/70 109/69 107/69   BP Location: Left arm  Right arm Right arm   Patient Position: Lying  Lying Lying   Pulse: 99 96 93 88   Resp: 18  18 18   Temp: 98 °F (36.7 °C)  97.3 °F (36.3 °C) 97.5 °F (36.4 °C)   TempSrc: Oral  Axillary Oral   SpO2: 95%  91% 94%   Weight:       Height:           Results from last 7 days   Lab Units 01/01/21  0508   WBC 10*3/mm3 10.74   HEMOGLOBIN g/dL 11.7*   HEMATOCRIT % 36.3   PLATELETS 10*3/mm3 255       Results from last 7 days   Lab Units 01/01/21  0508 12/31/20  0641   SODIUM mmol/L 138  --    POTASSIUM mmol/L 4.2 3.9   CHLORIDE mmol/L 105  --    CO2 mmol/L 20.9*  --    BUN mg/dL 22 21   CREATININE mg/dL 1.12* 1.22*   GLUCOSE mg/dL 383*  --    CALCIUM mg/dL 8.5*  --        Exam:    Right knee- dressing clean, dry, intact   Flex and extend toes and ankle   No calf pain, negative Homans sign   Positive sensation light touch right foot   Brisk cap refill all digits, palpable dorsalis pedis pulse    Impression: s/p right TKA    Plan:  1. PT/OT- weight-bear as tolerated, ambulation, range of motion  2. Pain control- oxycodone, Tylenol, Lyrica  3. DVT prophylaxis- Xarelto daily  4. Wound care-change dressing at home tomorrow, Prineo mesh and glue  5. Disposition- home with home health versus outpatient PT once medically stable and cleared by PT

## 2021-01-02 ENCOUNTER — READMISSION MANAGEMENT (OUTPATIENT)
Dept: CALL CENTER | Facility: HOSPITAL | Age: 76
End: 2021-01-02

## 2021-01-02 NOTE — OUTREACH NOTE
Prep Survey      Responses   [facility] facility patient discharged from?  [facility]   Is LACE score < 7 ?  Yes   Emergency Room discharge w/ pulse ox?  No   Eligibility  Readm Mgmt   Discharge diagnosis  Primary osteoarthritis of right knee   Does the patient have one of the following disease processes/diagnoses(primary or secondary)?  Other   Does the patient have Home health ordered?  Yes   What is the Home health agency?   [facility]    Is there a DME ordered?  Yes   What DME was ordered?  BSC and RW    Medication alerts for this patient  Xarelto    Prep survey completed?  Yes          Unique Zambrano RN

## 2021-01-04 ENCOUNTER — READMISSION MANAGEMENT (OUTPATIENT)
Dept: CALL CENTER | Facility: HOSPITAL | Age: 76
End: 2021-01-04

## 2021-01-04 NOTE — OUTREACH NOTE
LAG < 7 Survey      Responses   [facility] patient discharged from?  [facility]   Does the patient have one of the following disease processes/diagnoses(primary or secondary)?  Other   BHLAG <7 Attempt successful?  Yes   Call start time  1153   Call end time  1157   Discharge diagnosis  Primary osteoarthritis of right knee   Meds reviewed with patient/caregiver?  Yes   Is the patient having any side effects they believe may be caused by any medication additions or changes?  No   Does the patient have all medications ordered at discharge?  Yes   Is the patient taking all medications as directed (includes completed medication regime)?  Yes   Does the patient have a primary care provider?   Yes   Does the patient have an appointment with their PCP within 7 days of discharge?  Yes   Has the patient kept scheduled appointments due by today?  Yes   What is the Home health agency?   [facility]    Has home health visited the patient within 72 hours of discharge?  Yes   Psychosocial issues?  No   Did the patient receive a copy of their discharge instructions?  Yes   Nursing interventions  Reviewed instructions with patient   What is the patient's perception of their health status since discharge?  Improving   Is the patient/caregiver able to teach back signs and symptoms related to disease process for when to call PCP?  Yes   Is the patient/caregiver able to teach back signs and symptoms related to disease process for when to call 911?  Yes   Is the patient/caregiver able to teach back the hierarchy of who to call/visit for symptoms/problems? PCP, Specialist, Home health nurse, Urgent Care, ED, 911  Yes   Graduated  Yes          Mau Crawford RN

## 2021-01-14 NOTE — PROGRESS NOTES
CC: s/p right total knee arthroplasty, DOS 12/31/2020    Interval History: Jaz Harley returns for 2 week postoperative visit.  She is doing well. Pain is controlled with pain medication and is  improving. She denies any wound problem, fevers, or chills. Patient is continuing to work with home health PT. Ambulating with cane. Continuing DVT prophylaxis with use of Xarelto.     Physical Examination: Right knee was examined   Incision clean and dry   ROM 0-106,  4/5 strength   Stable to varus and valgus stress   Flex/extend ankle and toes   Positive sensation right foot   No calf pain, negative Homans sign bilaterally    Assessment/Plan:  Jaz Harley is recovering from surgery as expected.  We will continue outpatient therapy for range of motion, strengthening, and gait normalization.    She is to follow up in clinic in 4 weeks with x-rays right knee. Patient had all question answered today. Will continue DVT prophylaxis for an additional 2 weeks. Discussed with patient to avoid immersing incision for 4 weeks total after surgery.     Medications:  New Medications Ordered This Visit   Medications   • pregabalin (LYRICA) 75 MG capsule     Sig: Take 1 capsule by mouth Every 12 (Twelve) Hours.     Dispense:  28 capsule     Refill:  0       ARMIDA Abbott

## 2021-01-15 ENCOUNTER — OFFICE VISIT (OUTPATIENT)
Dept: ORTHOPEDIC SURGERY | Facility: CLINIC | Age: 76
End: 2021-01-15

## 2021-01-15 VITALS — BODY MASS INDEX: 38.09 KG/M2 | WEIGHT: 194 LBS | HEIGHT: 60 IN

## 2021-01-15 DIAGNOSIS — M17.11 PRIMARY OSTEOARTHRITIS OF RIGHT KNEE: Primary | ICD-10-CM

## 2021-01-15 DIAGNOSIS — Z96.651 STATUS POST TOTAL RIGHT KNEE REPLACEMENT: ICD-10-CM

## 2021-01-15 PROCEDURE — 99024 POSTOP FOLLOW-UP VISIT: CPT | Performed by: NURSE PRACTITIONER

## 2021-01-15 RX ORDER — PREGABALIN 75 MG/1
75 CAPSULE ORAL EVERY 12 HOURS SCHEDULED
Qty: 28 CAPSULE | Refills: 0 | Status: SHIPPED | OUTPATIENT
Start: 2021-01-15 | End: 2021-02-01

## 2021-01-20 ENCOUNTER — HOSPITAL ENCOUNTER (OUTPATIENT)
Dept: PHYSICAL THERAPY | Facility: HOSPITAL | Age: 76
Setting detail: THERAPIES SERIES
Discharge: HOME | End: 2021-01-20

## 2021-01-20 DIAGNOSIS — Z96.651 STATUS POST TOTAL RIGHT KNEE REPLACEMENT: Primary | ICD-10-CM

## 2021-01-20 PROCEDURE — 97161 PT EVAL LOW COMPLEX 20 MIN: CPT | Performed by: PHYSICAL THERAPIST

## 2021-01-20 PROCEDURE — 97110 THERAPEUTIC EXERCISES: CPT | Performed by: PHYSICAL THERAPIST

## 2021-01-20 NOTE — THERAPY EVALUATION
Outpatient Physical Therapy Ortho Initial Evaluation  STEPHEN Quiles     Patient Name: Jaz Harley  : 1945  MRN: [id number]  Today's Date: 2021      Visit Date: 2021    Patient Active Problem List   Diagnosis   • Tear of medial meniscus of right knee, current   • Stress fracture of right tibia   • Aortic valve sclerosis   • Benign essential HTN   • Thyroid disease   • Fibromyalgia   • GERD (gastroesophageal reflux disease)   • Hearing loss in left ear   • Hyperlipidemia   • Obesity   • Obstructive sleep apnea   • Pericardial effusion   • Tricuspid regurgitation        Past Medical History:   Diagnosis Date   • Anxiety    • Arthritis     knees, hands   • Asthma    • Diabetes (CMS/HCC)     last a1c 6.1   • Fibromyalgia    • Fracture, radius    • GERD (gastroesophageal reflux disease)    • Hyperlipidemia    • Hypertension    • Hyperthyroidism    • Right knee pain     scheduled for TKA   • Sleep apnea     uses bipap   • Stage 3 chronic kidney disease    • Stroke (CMS/HCC)     found on ct scan, no residuals         Past Surgical History:   Procedure Laterality Date   • APPENDECTOMY     • BLEPHAROPLASTY     • CARPAL TUNNEL RELEASE Right    • CERVICAL DISCECTOMY ANTERIOR     • HYSTERECTOMY      abd   • MASTOIDECTOMY         Visit Dx:     ICD-10-CM ICD-9-CM   1. Status post total right knee replacement  Z96.651 V43.65         Patient History     Row Name 21 1020             History    Chief Complaint  Difficulty Walking;Difficulty with daily activities;Joint stiffness;Pain  -      Type of Pain  Knee pain right  -GC      Date Current Problem(s) Began  20  -      Brief Description of Current Complaint  Pt reports several years of right knee pain. She had temporary relief of pain and symptoms with injections, but no lasting relief was achieved. She underwent a right TKA on 2020. She was hospitalized overnight before being discharged home. She received home [facility] therapy  until 1/15/2021. She followed up with ARMIDA Lassiter who has referred her for outpatient therapy.  -GC      Patient/Caregiver Goals  Relieve pain;Return to prior level of function;Improve mobility;Improve strength;Decrease swelling  -GC      Patient's Rating of General Health  Good  -GC      Hand Dominance  right-handed  -      Occupation/sports/leisure activities  Pt enjoys participating in [employer] activities  -      What clinical tests have you had for this problem?  X-ray  -      Results of Clinical Tests  end stage OA  -GC         Pain     Pain Location  Knee right  -GC      Pain at Present  0 no pain at rest  -GC      Pain at Best  0  -GC      Pain at Worst  2  -GC      Pain Frequency  Intermittent  -GC      Pain Description  Aching;Discomfort  -GC      What Performance Factors Make the Current Problem(s) WORSE?  Pt c/o some discomfort at night and she feels like it is primarily nerve related  -GC      What Performance Factors Make the Current Problem(s) BETTER?  Pt has no pain at rest, being off her feet  -      Difficulties with ADL's?  Pt has difficulty sleeping at night, being on her feet for ADLs  -         Daily Activities    Primary Language  English  -      Are you able to read  Yes  -GC      Are you able to write  Yes  -GC      How does patient learn best?  Listening  -      Teaching needs identified  Home Exercise Program;Management of Condition  -      Patient is concerned about/has problems with  Performing home management (household chores, shopping, care of dependents);Performing job responsibilities/community activities (work, school,;Walking  -GC      Does patient have problems with the following?  Depression  -      Barriers to learning  None  -      Functional Status  mobility issues preventing performance of daily activities  -      Pt Participated in POC and Goals  Yes  -GC         Safety    Are you being hurt, hit, or frightened by anyone at home or in your life?  No   -GC      Are you being neglected by a caregiver  No  -GC        User Key  (r) = Recorded By, (t) = Taken By, (c) = Cosigned By    Initials Name Provider Type    GC Manohar Torres PT Physical Therapist          PT Ortho     Row Name 01/20/21 1020       Posture/Observations    Posture/Observations Comments  Pt has moderate edema right knee. Surgical site is nicely healed.  -GC       Knee Palpation    Medial Joint Line  Right:;Tender  -GC       Patellar Accessory Motions    Superior glide  Right:;WNL  -GC    Inferior glide  Right:;WNL  -GC    Medial glide  Right:;WNL  -GC    Lateral glide  Right:;WNL  -GC       Knee Special Tests    Huber’s sign (DVT)  Right:;Negative  -GC       General ROM    GENERAL ROM COMMENTS  TKE < 8 degrees  -GC       Right Lower Ext    Rt Knee Extension/Flexion AROM  0-5-87 degrees  -GC       MMT Right Lower Ext    Rt Hip Flexion MMT, Gross Movement  (4/5) good  -GC    Rt Hip Extension MMT, Gross Movement  (4/5) good  -GC    Rt Hip ABduction MMT, Gross Movement  (4/5) good  -GC    Rt Hip ADduction MMT, Gross Movement  (3+/5) fair plus  -GC    Rt Knee Extension MMT, Gross Movement  (3+/5) fair plus  -GC    Rt Knee Flexion MMT, Gross Movement  (4/5) good  -GC    Rt Ankle Plantarflexion MMT, Gross Movement  (4+/5) good plus  -GC    Rt Ankle Dorsiflexion MMT, Gross Movement  (5/5) normal  -GC       Sensation    Light Touch  No apparent deficits  -GC       Lower Extremity Flexibility    Hamstrings  Right:;Moderately limited  -GC    Quadriceps  Right:;Moderately limited  -GC       Transfers    Comment (Transfers)  Pt is independent with all bed mobility and transfers  -       Gait/Stairs (Locomotion)    Comment (Gait/Stairs)  Pt ambulates with an antalgic gait right LE using a straight cane  -GC      User Key  (r) = Recorded By, (t) = Taken By, (c) = Cosigned By    Initials Name Provider Type    GC Manohar Torres PT Physical Therapist                      Therapy Education  Given: HEP,  Symptoms/condition management, Pain management  Program: New  How Provided: Verbal, Demonstration, Written  Provided to: Patient  Level of Understanding: Teach back education performed, Verbalized, Demonstrated     PT OP Goals     Row Name 01/20/21 1020          PT Short Term Goals    STG Date to Achieve  02/10/21  -     STG 1  Decrease right knee pain to 1/10 with activity.  -GC     STG 2  Increase right knee AROM to 0-110 degrees with testing.  -GC     STG 3  Increase right LE strength to at least 4/5 all planes with testing.  -GC     STG 4  Pt will be independent with her HEP issued by this therapist.  -GC        Long Term Goals    LTG Date to Achieve  03/03/21  -GC     LTG 1  Decrease right knee pain to 0/10 with activity.  -GC     LTG 2  Increase right knee AROM to 0-125 degrees with testing.  -GC     LTG 3  Increase right LE strength to at least 4+/5 all planes with testing.  -GC     LTG 4  Pt will ambulate normally on levels and stairs without assistive device.  -     LTG 5  Pt will be independnet with all ADLs and have a LEFS score > 65.  -        Time Calculation    PT Goal Re-Cert Due Date  02/17/21  -       User Key  (r) = Recorded By, (t) = Taken By, (c) = Cosigned By    Initials Name Provider Type     Manohar Torres, PT Physical Therapist          PT Assessment/Plan     Row Name 01/20/21 1020          PT Assessment    Functional Limitations  Impaired gait;Limitation in home management;Limitations in community activities;Limitations in functional capacity and performance;Performance in leisure activities  -     Impairments  Edema;Gait;Impaired flexibility;Impaired arterial circulation;Range of motion;Pain;Muscle strength  -     Assessment Comments  Pt presents 3 weeks s/p right TKA. She has moderate edema right knee, decresaed right knee ROM< decreased right LE strength, decreased ambulatory status, and decreased function secondary to the above.  -     Rehab Potential  Good  -      Patient/caregiver participated in establishment of treatment plan and goals  Yes  -GC     Patient would benefit from skilled therapy intervention  Yes  -GC        PT Plan    PT Frequency  2x/week;3x/week  -GC     Predicted Duration of Therapy Intervention (PT)  6 weeks  -GC     Planned CPT's?  PT EVAL LOW COMPLEXITY: 97309;PT THER PROC EA 15 MIN: 35280;PT MANUAL THERAPY EA 15 MIN: 85266;PT HOT OR COLD PACK TREAT MCARE;PT ELECTRICAL STIM UNATTEND:   -GC     PT Plan Comments  Pt is to continue her HEP 2x daily  -GC       User Key  (r) = Recorded By, (t) = Taken By, (c) = Cosigned By    Initials Name Provider Type     Manohar Torres, PT Physical Therapist            OP Exercises     Row Name 01/20/21 1020             Exercise 1    Exercise Name 1  Heel Slides  -GC      Cueing 1  Verbal;Tactile  -GC      Time 1  8 min  -GC         Exercise 2    Exercise Name 2  Wall Slides  -GC         Exercise 3    Exercise Name 3  Passive knee FLEX stretch  -GC      Cueing 3  Verbal;Tactile  -GC      Reps 3  10  -GC      Time 3  10 secs  -GC         Exercise 4    Exercise Name 4  Hamstring/gastroc stretch  -GC      Cueing 4  Verbal;Tactile  -GC      Reps 4  15  -GC      Time 4  10 secs  -GC         Exercise 5    Exercise Name 5  Supine knee EXT on roll  -GC      Cueing 5  Verbal;Tactile  -GC      Time 5  5 min  -GC         Exercise 6    Exercise Name 6  Passive knee EXT stretch  -GC      Cueing 6  Verbal;Tactile  -GC      Reps 6  10  -GC      Time 6  10 secs  -GC         Exercise 7    Exercise Name 7  SLR  -GC      Cueing 7  Verbal;Tactile  -GC      Reps 7  25  -GC         Exercise 8    Exercise Name 8  Hip ABD  -GC      Cueing 8  Verbal;Tactile  -GC      Reps 8  25  -GC         Exercise 9    Exercise Name 9  SAQ  -GC      Cueing 9  Verbal;Tactile  -GC      Reps 9  25  -GC        User Key  (r) = Recorded By, (t) = Taken By, (c) = Cosigned By    Initials Name Provider Type    Manohar Bowen, PT Physical Therapist                         Outcome Measure Options: Lower Extremity Functional Scale (LEFS)  Lower Extremity Functional Index  Any of your usual work, housework or school activities: Moderate difficulty  Your usual hobbies, recreational or sporting activities: Moderate difficulty  Getting into or out of the bath: No difficulty  Walking between rooms: No difficulty  Putting on your shoes or socks: No difficulty  Squatting: Extreme difficulty or unable to perform activity  Lifting an object, like a bag of groceries from the floor: No difficulty  Performing light activities around your home: Moderate difficulty  Performing heavy activities around your home: Moderate difficulty  Getting into or out of a car: No difficulty  Walking 2 blocks: Moderate difficulty  Walking a mile: Extreme difficulty or unable to perform activity  Going up or down 10 stairs (about 1 flight of stairs): Moderate difficulty  Standing for 1 hour: Quite a bit of difficulty  Sitting for 1 hour: A little bit of difficulty  Running on even ground: Extreme difficulty or unable to perform activity  Running on uneven ground: Extreme difficulty or unable to perform activity  Making sharp turns while running fast: Extreme difficulty or unable to perform activity  Hopping: Extreme difficulty or unable to perform activity  Rolling over in bed: No difficulty  Total: 40      Time Calculation:     Start Time: 1020  Stop Time: 1121  Time Calculation (min): 61 min     Therapy Charges for Today     Code Description Service Date Service Provider Modifiers Qty    73772479412 HC PT EVAL LOW COMPLEXITY 2 1/20/2021 Manohar Torres, PT GP 1    71735414492 HC PT THER PROC EA 15 MIN 1/20/2021 Manohar Torres, PT GP 2          PT G-Codes  Outcome Measure Options: Lower Extremity Functional Scale (LEFS)  Total: 40         Manohar Torres PT  1/20/2021

## 2021-01-22 ENCOUNTER — HOSPITAL ENCOUNTER (OUTPATIENT)
Dept: PHYSICAL THERAPY | Facility: HOSPITAL | Age: 76
Setting detail: THERAPIES SERIES
Discharge: HOME | End: 2021-01-22

## 2021-01-22 DIAGNOSIS — Z96.651 STATUS POST TOTAL RIGHT KNEE REPLACEMENT: Primary | ICD-10-CM

## 2021-01-22 PROCEDURE — 97110 THERAPEUTIC EXERCISES: CPT | Performed by: PHYSICAL THERAPIST

## 2021-01-22 NOTE — THERAPY TREATMENT NOTE
Outpatient Physical Therapy Ortho Treatment Note  STEPHEN Quiles     Patient Name: Jaz Harley  : 1945  MRN: [id number]  Today's Date: 2021      Visit Date: 2021    Visit Dx:    ICD-10-CM ICD-9-CM   1. Status post total right knee replacement  Z96.651 V43.65       Patient Active Problem List   Diagnosis   • Tear of medial meniscus of right knee, current   • Stress fracture of right tibia   • Aortic valve sclerosis   • Benign essential HTN   • Thyroid disease   • Fibromyalgia   • GERD (gastroesophageal reflux disease)   • Hearing loss in left ear   • Hyperlipidemia   • Obesity   • Obstructive sleep apnea   • Pericardial effusion   • Tricuspid regurgitation        Past Medical History:   Diagnosis Date   • Anxiety    • Arthritis     knees, hands   • Asthma    • Diabetes (CMS/HCC)     last a1c 6.1   • Fibromyalgia    • Fracture, radius    • GERD (gastroesophageal reflux disease)    • Hyperlipidemia    • Hypertension    • Hyperthyroidism    • Right knee pain     scheduled for TKA   • Sleep apnea     uses bipap   • Stage 3 chronic kidney disease (CMS/HCC)    • Stroke (CMS/HCC)     found on ct scan, no residuals         Past Surgical History:   Procedure Laterality Date   • APPENDECTOMY     • BLEPHAROPLASTY     • CARPAL TUNNEL RELEASE Right    • CERVICAL DISCECTOMY ANTERIOR     • HYSTERECTOMY      abd   • MASTOIDECTOMY         PT Ortho     Row Name 21 1130       Subjective Comments    Subjective Comments  Pt states her knee hurts a little more today.  -      User Key  (r) = Recorded By, (t) = Taken By, (c) = Cosigned By    Initials Name Provider Type     Manohar Torres, PT Physical Therapist                      PT Assessment/Plan     Row Name 21 1130          PT Assessment    Assessment Comments  Pt is doing well with increasing knee ROM and good tolerance to her exercise progression.  -        PT Plan    PT Plan Comments  Pt is to continue her HEP 1-2x daily.  -        User Key  (r) = Recorded By, (t) = Taken By, (c) = Cosigned By    Initials Name Provider Type    GC Manohar Torres PT Physical Therapist            OP Exercises     Row Name 01/22/21 1130             Subjective Comments    Subjective Comments  Pt states her knee hurts a little more today.  -GC         Exercise 1    Exercise Name 1  Heel Slides  -GC      Cueing 1  Verbal;Tactile  -GC      Time 1  8 min  -GC         Exercise 2    Exercise Name 2  Wall Slides  -GC      Cueing 2  Verbal;Tactile  -GC      Time 2  8 min  -GC         Exercise 3    Exercise Name 3  Passive knee FLEX stretch  -GC      Cueing 3  Verbal;Tactile  -GC      Reps 3  10  -GC      Time 3  10 secs  -GC         Exercise 4    Exercise Name 4  Hamstring/gastroc stretch  -GC      Cueing 4  Verbal;Tactile  -GC      Reps 4  15  -GC      Time 4  10 secs  -GC         Exercise 5    Exercise Name 5  Supine knee EXT on roll  -GC      Cueing 5  Verbal;Tactile  -GC      Time 5  5 min  -GC         Exercise 6    Exercise Name 6  Passive knee EXT stretch  -GC      Cueing 6  Verbal;Tactile  -GC      Reps 6  10  -GC      Time 6  10 secs  -GC         Exercise 7    Exercise Name 7  SLR  -GC      Cueing 7  Verbal;Tactile  -GC      Reps 7  25  -GC         Exercise 8    Exercise Name 8  Hip ABD  -GC      Cueing 8  Verbal;Tactile  -GC      Reps 8  25  -GC         Exercise 9    Exercise Name 9  SAQ  -GC      Cueing 9  Verbal;Tactile  -GC      Reps 9  40  -GC         Exercise 10    Exercise Name 10  LAQ  -GC      Cueing 10  Verbal;Tactile  -GC      Reps 10  40  -GC        User Key  (r) = Recorded By, (t) = Taken By, (c) = Cosigned By    Initials Name Provider Type     Manohar Torres PT Physical Therapist                                          Time Calculation:   Start Time: 1130  Stop Time: 1222  Time Calculation (min): 52 min  Therapy Charges for Today     Code Description Service Date Service Provider Modifiers Qty    12431733884  PT THER PROC EA 15 MIN 1/22/2021  Manohar Torres, PT GP 2                    Manohar Torres, PT  1/22/2021

## 2021-01-27 ENCOUNTER — HOSPITAL ENCOUNTER (OUTPATIENT)
Dept: PHYSICAL THERAPY | Facility: HOSPITAL | Age: 76
Setting detail: THERAPIES SERIES
Discharge: HOME | End: 2021-01-27

## 2021-01-27 DIAGNOSIS — Z96.651 STATUS POST TOTAL RIGHT KNEE REPLACEMENT: Primary | ICD-10-CM

## 2021-01-27 PROCEDURE — 97110 THERAPEUTIC EXERCISES: CPT

## 2021-01-27 PROCEDURE — 97140 MANUAL THERAPY 1/> REGIONS: CPT

## 2021-01-27 NOTE — THERAPY TREATMENT NOTE
Outpatient Physical Therapy Ortho Treatment Note  STEPHEN Quiles     Patient Name: Jza Harley  : 1945  MRN: [id number]  Today's Date: 2021      Visit Date: 2021    Visit Dx:    ICD-10-CM ICD-9-CM   1. Status post total right knee replacement  Z96.651 V43.65       Patient Active Problem List   Diagnosis   • Tear of medial meniscus of right knee, current   • Stress fracture of right tibia   • Aortic valve sclerosis   • Benign essential HTN   • Thyroid disease   • Fibromyalgia   • GERD (gastroesophageal reflux disease)   • Hearing loss in left ear   • Hyperlipidemia   • Obesity   • Obstructive sleep apnea   • Pericardial effusion   • Tricuspid regurgitation        Past Medical History:   Diagnosis Date   • Anxiety    • Arthritis     knees, hands   • Asthma    • Diabetes (CMS/HCC)     last a1c 6.1   • Fibromyalgia    • Fracture, radius    • GERD (gastroesophageal reflux disease)    • Hyperlipidemia    • Hypertension    • Hyperthyroidism    • Right knee pain     scheduled for TKA   • Sleep apnea     uses bipap   • Stage 3 chronic kidney disease (CMS/HCC)    • Stroke (CMS/HCC)     found on ct scan, no residuals         Past Surgical History:   Procedure Laterality Date   • APPENDECTOMY     • BLEPHAROPLASTY     • CARPAL TUNNEL RELEASE Right    • CERVICAL DISCECTOMY ANTERIOR     • HYSTERECTOMY      abd   • MASTOIDECTOMY         PT Ortho     Row Name 21 1200       Right Lower Ext    Rt Knee Extension/Flexion AROM  0-1-105 post stretch  -KM      User Key  (r) = Recorded By, (t) = Taken By, (c) = Cosigned By    Initials Name Provider Type    Sandie Holder PTA Physical Therapy Assistant                      PT Assessment/Plan     Row Name 21 1200          PT Assessment    Assessment Comments  Pt is progressing well with PT with improved AROM and overall strength.Pt tolerated additional exercises well.   -KM        PT Plan    PT Plan Comments  Continue to progress as tolerated.    -KM       User Key  (r) = Recorded By, (t) = Taken By, (c) = Cosigned By    Initials Name Provider Type    Sandie Holder PTA Physical Therapy Assistant            OP Exercises     Row Name 01/27/21 1200             Subjective Comments    Subjective Comments  Pt states her knee is doing well.   -KM         Exercise 1    Exercise Name 1  Heel Slides  -KM      Cueing 1  Verbal;Tactile  -KM      Time 1  8 min  -KM         Exercise 2    Exercise Name 2  Wall Slides  -KM      Cueing 2  Verbal;Tactile  -KM      Time 2  8 min  -KM         Exercise 3    Exercise Name 3  Passive knee FLEX stretch  -KM      Cueing 3  Verbal;Tactile  -KM      Reps 3  10  -KM      Time 3  10 secs  -KM         Exercise 4    Exercise Name 4  Hamstring/gastroc stretch  -KM      Cueing 4  Verbal;Tactile  -KM      Reps 4  15  -KM      Time 4  10 secs  -KM         Exercise 5    Exercise Name 5  Supine knee EXT on roll  -KM      Cueing 5  Verbal;Tactile  -KM      Time 5  5 min  -KM         Exercise 6    Exercise Name 6  Passive knee EXT stretch  -KM      Cueing 6  Verbal;Tactile  -KM      Reps 6  10  -KM      Time 6  10 secs  -KM         Exercise 7    Exercise Name 7  SLR  -KM      Cueing 7  Verbal;Tactile  -KM      Reps 7  25  -KM         Exercise 8    Exercise Name 8  Hip ABD  -KM      Cueing 8  Verbal;Tactile  -KM      Reps 8  25  -KM         Exercise 9    Exercise Name 9  SAQ  -KM      Cueing 9  Verbal;Tactile  -KM      Reps 9  25  -KM      Time 9  1#  -KM         Exercise 10    Exercise Name 10  LAQ  -KM      Cueing 10  Verbal;Tactile  -KM      Reps 10  25  -KM      Time 10  1#  -KM         Exercise 11    Exercise Name 11  TKE  -KM      Reps 11  25  -KM      Time 11  Silver  -KM         Exercise 12    Exercise Name 12  Heel Raises   -KM        User Key  (r) = Recorded By, (t) = Taken By, (c) = Cosigned By    Initials Name Provider Type    Sandie Holder PTA Physical Therapy Assistant                                          Time  Calculation:   Start Time: 1200  Stop Time: 1243  Time Calculation (min): 43 min  Therapy Charges for Today     Code Description Service Date Service Provider Modifiers Qty    17462228056 HC PT MANUAL THERAPY EA 15 MIN 1/27/2021 Sandie Arzate PTA GP 1    30028978996 HC PT THER PROC EA 15 MIN 1/27/2021 Sandie Arzate PTA GP 1                    Sandie Arzate PTA  1/27/2021

## 2021-01-29 ENCOUNTER — HOSPITAL ENCOUNTER (OUTPATIENT)
Dept: PHYSICAL THERAPY | Facility: HOSPITAL | Age: 76
Setting detail: THERAPIES SERIES
Discharge: HOME | End: 2021-01-29

## 2021-01-29 DIAGNOSIS — Z96.651 STATUS POST TOTAL RIGHT KNEE REPLACEMENT: Primary | ICD-10-CM

## 2021-01-29 PROCEDURE — 97140 MANUAL THERAPY 1/> REGIONS: CPT

## 2021-01-29 PROCEDURE — 97110 THERAPEUTIC EXERCISES: CPT

## 2021-01-29 NOTE — THERAPY TREATMENT NOTE
Outpatient Physical Therapy Ortho Treatment Note  STEPHEN Quiles     Patient Name: Jaz Harley  : 1945  MRN: [id number]  Today's Date: 2021      Visit Date: 2021    Visit Dx:    ICD-10-CM ICD-9-CM   1. Status post total right knee replacement  Z96.651 V43.65       Patient Active Problem List   Diagnosis   • Tear of medial meniscus of right knee, current   • Stress fracture of right tibia   • Aortic valve sclerosis   • Benign essential HTN   • Thyroid disease   • Fibromyalgia   • GERD (gastroesophageal reflux disease)   • Hearing loss in left ear   • Hyperlipidemia   • Obesity   • Obstructive sleep apnea   • Pericardial effusion   • Tricuspid regurgitation        Past Medical History:   Diagnosis Date   • Anxiety    • Arthritis     knees, hands   • Asthma    • Diabetes (CMS/HCC)     last a1c 6.1   • Fibromyalgia    • Fracture, radius    • GERD (gastroesophageal reflux disease)    • Hyperlipidemia    • Hypertension    • Hyperthyroidism    • Right knee pain     scheduled for TKA   • Sleep apnea     uses bipap   • Stage 3 chronic kidney disease (CMS/HCC)    • Stroke (CMS/HCC)     found on ct scan, no residuals         Past Surgical History:   Procedure Laterality Date   • APPENDECTOMY     • BLEPHAROPLASTY     • CARPAL TUNNEL RELEASE Right    • CERVICAL DISCECTOMY ANTERIOR     • HYSTERECTOMY      abd   • MASTOIDECTOMY     • TOTAL KNEE ARTHROPLASTY Right 2020    Procedure: TOTAL KNEE ARTHROPLASTY AND ALL ASSOCIATED PROCEDURES;  Surgeon: Sj Hannah MD;  Location: [facility];  Service: Orthopedics;  Laterality: Right;                       PT Assessment/Plan     Row Name 21 1200          PT Assessment    Assessment Comments  Treatment plan slightly modified due to symptoms reported. Pt is progressing well with ROM and strength.   -KM        PT Plan    PT Plan Comments  Continue per POC  -KM       User Key  (r) = Recorded By, (t) = Taken By, (c) = Cosigned By    Initials  Name Provider Type    Sandie Holder PTA Physical Therapy Assistant            OP Exercises     Row Name 01/29/21 1200             Subjective Comments    Subjective Comments  Pt states her knee was and swollen after previous session.   -KM         Exercise 1    Exercise Name 1  Heel Slides  -KM      Cueing 1  Verbal;Tactile  -KM      Time 1  8 min  -KM         Exercise 2    Exercise Name 2  Wall Slides  -KM      Cueing 2  Verbal;Tactile  -KM      Time 2  8 min  -KM         Exercise 3    Exercise Name 3  Passive knee FLEX stretch  -KM      Cueing 3  Verbal;Tactile  -KM      Reps 3  10  -KM      Time 3  10 secs  -KM         Exercise 4    Exercise Name 4  Hamstring/gastroc stretch  -KM      Cueing 4  Verbal;Tactile  -KM      Reps 4  15  -KM      Time 4  10 secs  -KM         Exercise 5    Exercise Name 5  Supine knee EXT on roll  -KM      Cueing 5  Verbal;Tactile  -KM      Time 5  5 min  -KM         Exercise 6    Exercise Name 6  Passive knee EXT stretch  -KM      Cueing 6  Verbal;Tactile  -KM      Reps 6  10  -KM      Time 6  10 secs  -KM         Exercise 7    Exercise Name 7  SLR  -KM      Cueing 7  Verbal;Tactile  -KM      Reps 7  25  -KM         Exercise 8    Exercise Name 8  Hip ABD  -KM      Cueing 8  Verbal;Tactile  -KM      Reps 8  25  -KM         Exercise 9    Exercise Name 9  SAQ  -KM      Cueing 9  Verbal;Tactile  -KM      Reps 9  25  -KM      Time 9  --  -KM         Exercise 10    Exercise Name 10  LAQ  -KM      Cueing 10  Verbal;Tactile  -KM      Reps 10  25  -KM      Time 10  --  -KM         Exercise 11    Exercise Name 11  TKE  -KM      Reps 11  25  -KM      Time 11  Black  -KM         Exercise 12    Exercise Name 12  Heel Raises   -KM      Reps 12  25  -KM      Additional Comments  started 1/27 -KM        User Key  (r) = Recorded By, (t) = Taken By, (c) = Cosigned By    Initials Name Provider Type    Sandie Holder PTA Physical Therapy Assistant                                          Time  Calculation:   Start Time: 1200  Stop Time: 1255  Time Calculation (min): 55 min  Therapy Charges for Today     Code Description Service Date Service Provider Modifiers Qty    88581979066 HC PT MANUAL THERAPY EA 15 MIN 1/29/2021 Sandie Arzate PTA GP 1    37598970069 HC PT THER PROC EA 15 MIN 1/29/2021 Sandie Arzate PTA GP 1                    Sandie Arzate PTA  1/29/2021

## 2021-02-01 DIAGNOSIS — Z96.651 STATUS POST TOTAL RIGHT KNEE REPLACEMENT: ICD-10-CM

## 2021-02-01 RX ORDER — PREGABALIN 75 MG/1
CAPSULE ORAL
Qty: 28 CAPSULE | Refills: 0 | Status: SHIPPED | OUTPATIENT
Start: 2021-02-01 | End: 2022-12-19

## 2021-02-03 ENCOUNTER — HOSPITAL ENCOUNTER (OUTPATIENT)
Dept: PHYSICAL THERAPY | Facility: HOSPITAL | Age: 76
Setting detail: THERAPIES SERIES
Discharge: HOME | End: 2021-02-03

## 2021-02-03 DIAGNOSIS — Z96.651 STATUS POST TOTAL RIGHT KNEE REPLACEMENT: Primary | ICD-10-CM

## 2021-02-03 PROCEDURE — 97110 THERAPEUTIC EXERCISES: CPT

## 2021-02-03 NOTE — THERAPY TREATMENT NOTE
Outpatient Physical Therapy Ortho Treatment Note  STEPHEN Quiles     Patient Name: Jaz Harley  : 1945  MRN: [id number]  Today's Date: 2/3/2021      Visit Date: 2021    Visit Dx:    ICD-10-CM ICD-9-CM   1. Status post total right knee replacement  Z96.651 V43.65       Patient Active Problem List   Diagnosis   • Tear of medial meniscus of right knee, current   • Stress fracture of right tibia   • Aortic valve sclerosis   • Benign essential HTN   • Thyroid disease   • Fibromyalgia   • GERD (gastroesophageal reflux disease)   • Hearing loss in left ear   • Hyperlipidemia   • Obesity   • Obstructive sleep apnea   • Pericardial effusion   • Tricuspid regurgitation        Past Medical History:   Diagnosis Date   • Anxiety    • Arthritis     knees, hands   • Asthma    • Diabetes (CMS/HCC)     last a1c 6.1   • Fibromyalgia    • Fracture, radius    • GERD (gastroesophageal reflux disease)    • Hyperlipidemia    • Hypertension    • Hyperthyroidism    • Right knee pain     scheduled for TKA   • Sleep apnea     uses bipap   • Stage 3 chronic kidney disease (CMS/HCC)    • Stroke (CMS/HCC)     found on ct scan, no residuals         Past Surgical History:   Procedure Laterality Date   • APPENDECTOMY     • BLEPHAROPLASTY     • CARPAL TUNNEL RELEASE Right    • CERVICAL DISCECTOMY ANTERIOR     • HYSTERECTOMY      abd   • MASTOIDECTOMY     • TOTAL KNEE ARTHROPLASTY Right 2020    Procedure: TOTAL KNEE ARTHROPLASTY AND ALL ASSOCIATED PROCEDURES;  Surgeon: Sj Hannah MD;  Location: [facility];  Service: Orthopedics;  Laterality: Right;                       PT Assessment/Plan     Row Name 21 1100          PT Assessment    Assessment Comments  Pt able to tolerate treatment session fairly well with some modifications; sore/ache reported around patella.   -KM        PT Plan    PT Plan Comments  Continue to progress as tolerated.   -KM       User Key  (r) = Recorded By, (t) = Taken By, (c) =  Cosigned By    Initials Name Provider Type    Sandie Holder PTA Physical Therapy Assistant            OP Exercises     Row Name 02/03/21 1100             Subjective Comments    Subjective Comments  Pt states she is not doing well today and has increased pain around her patella.   -KM         Exercise 1    Exercise Name 1  Heel Slides  -KM      Cueing 1  Verbal;Tactile  -KM      Time 1  8 min  -KM         Exercise 2    Exercise Name 2  Wall Slides  -KM      Cueing 2  Verbal;Tactile  -KM      Time 2  5 min  -KM         Exercise 3    Exercise Name 3  Passive knee FLEX stretch  -KM      Cueing 3  Verbal;Tactile  -KM      Reps 3  10  -KM      Time 3  10 secs  -KM         Exercise 4    Exercise Name 4  Hamstring/gastroc stretch  -KM      Cueing 4  Verbal;Tactile  -KM      Reps 4  15  -KM      Time 4  10 secs  -KM         Exercise 5    Exercise Name 5  Supine knee EXT on roll  -KM      Cueing 5  Verbal;Tactile  -KM      Time 5  5 min  -KM         Exercise 6    Exercise Name 6  Passive knee EXT stretch  -KM      Cueing 6  Verbal;Tactile  -KM      Reps 6  10  -KM      Time 6  10 secs  -KM         Exercise 7    Exercise Name 7  SLR  -KM      Cueing 7  Verbal;Tactile  -KM      Reps 7  25  -KM         Exercise 8    Exercise Name 8  Hip ABD  -KM      Cueing 8  Verbal;Tactile  -KM      Reps 8  25  -KM         Exercise 9    Exercise Name 9  SAQ  -KM      Cueing 9  Verbal;Tactile  -KM      Reps 9  25  -KM         Exercise 10    Exercise Name 10  LAQ  -KM      Cueing 10  Verbal;Tactile  -KM      Reps 10  25  -KM         Exercise 11    Exercise Name 11  TKE  -KM      Reps 11  --  -KM      Time 11  --  -KM         Exercise 12    Exercise Name 12  Heel Raises   -KM      Reps 12  25  -KM        User Key  (r) = Recorded By, (t) = Taken By, (c) = Cosigned By    Initials Name Provider Type    Sandie Holder PTA Physical Therapy Assistant                                          Time Calculation:   Start Time: 1100  Stop Time:  1146  Time Calculation (min): 46 min  Therapy Charges for Today     Code Description Service Date Service Provider Modifiers Qty    80283009450 HC PT THER PROC EA 15 MIN 2/3/2021 Sandie Arzate, PTA GP 1                    Sandie Arzate PTA  2/3/2021

## 2021-02-05 ENCOUNTER — HOSPITAL ENCOUNTER (OUTPATIENT)
Dept: PHYSICAL THERAPY | Facility: HOSPITAL | Age: 76
Setting detail: THERAPIES SERIES
Discharge: HOME | End: 2021-02-05

## 2021-02-05 DIAGNOSIS — Z96.651 STATUS POST TOTAL RIGHT KNEE REPLACEMENT: Primary | ICD-10-CM

## 2021-02-05 PROCEDURE — 97110 THERAPEUTIC EXERCISES: CPT

## 2021-02-05 NOTE — THERAPY TREATMENT NOTE
Outpatient Physical Therapy Ortho Treatment Note  STEPHEN Quiles     Patient Name: Jaz Harley  : 1945  MRN: [id number]  Today's Date: 2021      Visit Date: 2021    Visit Dx:    ICD-10-CM ICD-9-CM   1. Status post total right knee replacement  Z96.651 V43.65       Patient Active Problem List   Diagnosis   • Tear of medial meniscus of right knee, current   • Stress fracture of right tibia   • Aortic valve sclerosis   • Benign essential HTN   • Thyroid disease   • Fibromyalgia   • GERD (gastroesophageal reflux disease)   • Hearing loss in left ear   • Hyperlipidemia   • Obesity   • Obstructive sleep apnea   • Pericardial effusion   • Tricuspid regurgitation        Past Medical History:   Diagnosis Date   • Anxiety    • Arthritis     knees, hands   • Asthma    • Diabetes (CMS/HCC)     last a1c 6.1   • Fibromyalgia    • Fracture, radius    • GERD (gastroesophageal reflux disease)    • Hyperlipidemia    • Hypertension    • Hyperthyroidism    • Right knee pain     scheduled for TKA   • Sleep apnea     uses bipap   • Stage 3 chronic kidney disease (CMS/HCC)    • Stroke (CMS/HCC)     found on ct scan, no residuals         Past Surgical History:   Procedure Laterality Date   • APPENDECTOMY     • BLEPHAROPLASTY     • CARPAL TUNNEL RELEASE Right    • CERVICAL DISCECTOMY ANTERIOR     • HYSTERECTOMY      abd   • MASTOIDECTOMY     • TOTAL KNEE ARTHROPLASTY Right 2020    Procedure: TOTAL KNEE ARTHROPLASTY AND ALL ASSOCIATED PROCEDURES;  Surgeon: Sj Hannah MD;  Location: [facility];  Service: Orthopedics;  Laterality: Right;       PT Ortho     Row Name 21 1025       Right Lower Ext    Rt Knee Extension/Flexion AROM  0-115 post stretch  -KM      User Key  (r) = Recorded By, (t) = Taken By, (c) = Cosigned By    Initials Name Provider Type    Sandie Holder PTA Physical Therapy Assistant                      PT Assessment/Plan     Row Name 21 1025          PT Assessment     Assessment Comments  Continue to push ROM, primarily flexion; Good tolerance to current strengthening program.  -KM        PT Plan    PT Plan Comments  Continue per POC  -KM       User Key  (r) = Recorded By, (t) = Taken By, (c) = Cosigned By    Initials Name Provider Type    Sandie Holder, LINDA Physical Therapy Assistant            OP Exercises     Row Name 02/05/21 1023             Subjective Comments    Subjective Comments  Pt states her knee feels much better compared to previous visit.   -KM         Exercise 1    Exercise Name 1  Heel Slides  -KM      Cueing 1  Verbal;Tactile  -KM      Time 1  8 min  -KM         Exercise 2    Exercise Name 2  Wall Slides  -KM      Cueing 2  Verbal;Tactile  -KM      Time 2  5 min  -KM         Exercise 3    Exercise Name 3  Passive knee FLEX stretch  -KM      Cueing 3  Verbal;Tactile  -KM      Reps 3  10  -KM      Time 3  10 secs  -KM         Exercise 4    Exercise Name 4  Hamstring/gastroc stretch  -KM      Cueing 4  Verbal;Tactile  -KM      Reps 4  15  -KM      Time 4  10 secs  -KM         Exercise 5    Exercise Name 5  Supine knee EXT on roll  -KM      Cueing 5  Verbal;Tactile  -KM      Time 5  5 min  -KM         Exercise 6    Exercise Name 6  Passive knee EXT stretch  -KM      Cueing 6  Verbal;Tactile  -KM      Reps 6  10  -KM      Time 6  10 secs  -KM         Exercise 7    Exercise Name 7  SLR  -KM      Cueing 7  Verbal;Tactile  -KM      Reps 7  25  -KM         Exercise 8    Exercise Name 8  Hip ABD  -KM      Cueing 8  Verbal;Tactile  -KM      Reps 8  25  -KM         Exercise 9    Exercise Name 9  SAQ  -KM      Cueing 9  Verbal;Tactile  -KM      Reps 9  25  -KM         Exercise 10    Exercise Name 10  LAQ  -KM      Cueing 10  Verbal;Tactile  -KM      Reps 10  25  -KM         Exercise 11    Exercise Name 11  TKE  -KM         Exercise 12    Exercise Name 12  Heel Raises   -KM      Reps 12  25  -KM        User Key  (r) = Recorded By, (t) = Taken By, (c) = Cosigned By     Initials Name Provider Type     Sandie Arzate PTA Physical Therapy Assistant                                          Time Calculation:   Start Time: 1025  Stop Time: 1113  Time Calculation (min): 48 min  Therapy Charges for Today     Code Description Service Date Service Provider Modifiers Qty    36311814100 HC PT THER PROC EA 15 MIN 2/5/2021 Sandie Arzate PTA GP 1                    Sandie Arzate PTA  2/5/2021

## 2021-02-09 ENCOUNTER — HOSPITAL ENCOUNTER (OUTPATIENT)
Dept: PHYSICAL THERAPY | Facility: HOSPITAL | Age: 76
Setting detail: THERAPIES SERIES
Discharge: HOME | End: 2021-02-09

## 2021-02-09 DIAGNOSIS — Z96.651 STATUS POST TOTAL RIGHT KNEE REPLACEMENT: Primary | ICD-10-CM

## 2021-02-09 PROCEDURE — 97110 THERAPEUTIC EXERCISES: CPT

## 2021-02-09 PROCEDURE — 97140 MANUAL THERAPY 1/> REGIONS: CPT

## 2021-02-09 NOTE — THERAPY TREATMENT NOTE
Outpatient Physical Therapy Ortho Treatment Note  STEPHEN Quiles     Patient Name: Jaz Harley  : 1945  MRN: [id number]  Today's Date: 2021      Visit Date: 2021    Visit Dx:    ICD-10-CM ICD-9-CM   1. Status post total right knee replacement  Z96.651 V43.65       Patient Active Problem List   Diagnosis   • Tear of medial meniscus of right knee, current   • Stress fracture of right tibia   • Aortic valve sclerosis   • Benign essential HTN   • Thyroid disease   • Fibromyalgia   • GERD (gastroesophageal reflux disease)   • Hearing loss in left ear   • Hyperlipidemia   • Obesity   • Obstructive sleep apnea   • Pericardial effusion   • Tricuspid regurgitation        Past Medical History:   Diagnosis Date   • Anxiety    • Arthritis     knees, hands   • Asthma    • Diabetes (CMS/HCC)     last a1c 6.1   • Fibromyalgia    • Fracture, radius    • GERD (gastroesophageal reflux disease)    • Hyperlipidemia    • Hypertension    • Hyperthyroidism    • Right knee pain     scheduled for TKA   • Sleep apnea     uses bipap   • Stage 3 chronic kidney disease (CMS/HCC)    • Stroke (CMS/HCC)     found on ct scan, no residuals         Past Surgical History:   Procedure Laterality Date   • APPENDECTOMY     • BLEPHAROPLASTY     • CARPAL TUNNEL RELEASE Right    • CERVICAL DISCECTOMY ANTERIOR     • HYSTERECTOMY      abd   • MASTOIDECTOMY     • TOTAL KNEE ARTHROPLASTY Right 2020    Procedure: TOTAL KNEE ARTHROPLASTY AND ALL ASSOCIATED PROCEDURES;  Surgeon: Sj Hannah MD;  Location: [facility];  Service: Orthopedics;  Laterality: Right;                       PT Assessment/Plan     Row Name 21 1000          PT Assessment    Assessment Comments  Pt with improved tolerance to prescribed exercises including the addition of ckc exercises.   -KM        PT Plan    PT Plan Comments  Continue to progress as tolerated.   -KM       User Key  (r) = Recorded By, (t) = Taken By, (c) = Cosigned By     "Initials Name Provider Type    Sandie Holder PTA Physical Therapy Assistant            OP Exercises     Row Name 02/09/21 1000             Subjective Comments    Subjective Comments  Pt states she twisted her knee is bed this morning and has some soreness, however prior her knee felt good.   -KM         Exercise 1    Exercise Name 1  Heel Slides  -KM      Cueing 1  Verbal;Tactile  -KM      Time 1  8 min  -KM         Exercise 2    Exercise Name 2  Wall Slides  -KM      Cueing 2  Verbal;Tactile  -KM      Time 2  5 min  -KM         Exercise 3    Exercise Name 3  Passive knee FLEX stretch  -KM      Cueing 3  Verbal;Tactile  -KM      Reps 3  10  -KM      Time 3  10 secs  -KM         Exercise 4    Exercise Name 4  Hamstring/gastroc stretch  -KM      Cueing 4  Verbal;Tactile  -KM      Reps 4  15  -KM      Time 4  10 secs  -KM         Exercise 5    Exercise Name 5  Supine knee EXT on roll  -KM      Cueing 5  Verbal;Tactile  -KM      Time 5  5 min  -KM         Exercise 6    Exercise Name 6  Passive knee EXT stretch  -KM      Cueing 6  Verbal;Tactile  -KM      Reps 6  10  -KM      Time 6  10 secs  -KM         Exercise 7    Exercise Name 7  SLR  -KM      Cueing 7  Verbal;Tactile  -KM      Reps 7  25  -KM         Exercise 8    Exercise Name 8  Hip ABD  -KM      Cueing 8  Verbal;Tactile  -KM      Reps 8  25  -KM         Exercise 9    Exercise Name 9  SAQ  -KM      Cueing 9  Verbal;Tactile  -KM      Reps 9  25  -KM         Exercise 10    Exercise Name 10  LAQ  -KM      Cueing 10  Verbal;Tactile  -KM      Reps 10  25  -KM         Exercise 11    Exercise Name 11  TKE  -KM         Exercise 12    Exercise Name 12  Heel Raises   -KM      Reps 12  25  -KM         Exercise 13    Exercise Name 13  4\" Fwd Step Ups  -KM      Reps 13  10 each  -KM        User Key  (r) = Recorded By, (t) = Taken By, (c) = Cosigned By    Initials Name Provider Type    Sandie Holder PTA Physical Therapy Assistant                                    "       Time Calculation:   Start Time: 1000  Stop Time: 1050  Time Calculation (min): 50 min  Therapy Charges for Today     Code Description Service Date Service Provider Modifiers Qty    91764829710 HC PT MANUAL THERAPY EA 15 MIN 2/9/2021 Sandie Arzate PTA GP 1    34166007839 HC PT THER PROC EA 15 MIN 2/9/2021 Sandie Arzate PTA GP 1                    Sandie Arzate PTA  2/9/2021

## 2021-02-12 ENCOUNTER — HOSPITAL ENCOUNTER (OUTPATIENT)
Dept: PHYSICAL THERAPY | Facility: HOSPITAL | Age: 76
Setting detail: THERAPIES SERIES
Discharge: HOME | End: 2021-02-12

## 2021-02-12 ENCOUNTER — OFFICE VISIT (OUTPATIENT)
Dept: ORTHOPEDIC SURGERY | Facility: CLINIC | Age: 76
End: 2021-02-12

## 2021-02-12 VITALS — BODY MASS INDEX: 38.28 KG/M2 | HEIGHT: 60 IN | WEIGHT: 195 LBS

## 2021-02-12 DIAGNOSIS — Z96.651 STATUS POST TOTAL RIGHT KNEE REPLACEMENT: Primary | ICD-10-CM

## 2021-02-12 PROCEDURE — 99024 POSTOP FOLLOW-UP VISIT: CPT | Performed by: ORTHOPAEDIC SURGERY

## 2021-02-12 PROCEDURE — 73562 X-RAY EXAM OF KNEE 3: CPT | Performed by: ORTHOPAEDIC SURGERY

## 2021-02-12 PROCEDURE — 97110 THERAPEUTIC EXERCISES: CPT

## 2021-02-12 PROCEDURE — 97140 MANUAL THERAPY 1/> REGIONS: CPT

## 2021-02-12 NOTE — PROGRESS NOTES
CC: s/p right total knee arthroplasty, DOS 12/31/2020  Interval History: Jaz Harley returns for 6 week postoperative visit.  She is doing well. Pain is controlled with pain medication and is improving. She currently rates her pain as 4/10 in severity. She denies any wound problem, fevers, or chills. Patient is continuing to work with PT. Ambulating without cane.     Physical Examination: Right knee was examined   Incision well healed   ROM 0-125   Stable to varus and valgus stress at 0 to 30   Effusion - minimal   Flex/extend ankle and toes   Positive sensation right foot   No calf pain, negative Homans sign bilaterally    Radiographic review: Radiographs of the right knee 3 views, AP, lateral and patella axial views, compared to immediate postop films, indication s/p TKA,  shows that the implant is in good position. There is no evidence of implant loosening or osteolysis, no dislocation or periprosthetic fractures. Overall alignment acceptable at this time.     Assessment/Plan:  Jaz Harley is recovering from surgery as expected.  We will continue therapy for range of motion, strengthening, and gait normalization.  She will use topical pain relief cream with massage as needed.  Reduce lyrica as needed.  She is to follow up in clinic in 6 weeks with no xrays. Patient had all question answered today.    Medications:  No orders of the defined types were placed in this encounter.      SCRIBE ATTESTATION:  I, Vinh Heath, attest that all medical record entries for this patient were documented by me acting as a medical scribe for Sj Hannah MD.    PROVIDER ATTESTATION:  ISj MD, personally performed the services described in this documentation. All medical record entries made by the scribe were at my direction and in my presence. I have reviewed the chart and discharge instructions and agree that the record reflects my personal performance and is accurate and complete.   Sj Hannah MD.    Electronically signed: Sj Hannah MD 2/12/2021 10:54 EST

## 2021-02-12 NOTE — THERAPY TREATMENT NOTE
Outpatient Physical Therapy Ortho Treatment Note  STEPHEN Quiles     Patient Name: Jaz Harley  : 1945  MRN: [id number]  Today's Date: 2021      Visit Date: 2021    Visit Dx:    ICD-10-CM ICD-9-CM   1. Status post total right knee replacement  Z96.651 V43.65       Patient Active Problem List   Diagnosis   • Tear of medial meniscus of right knee, current   • Stress fracture of right tibia   • Aortic valve sclerosis   • Benign essential HTN   • Thyroid disease   • Fibromyalgia   • GERD (gastroesophageal reflux disease)   • Hearing loss in left ear   • Hyperlipidemia   • Obesity   • Obstructive sleep apnea   • Pericardial effusion   • Tricuspid regurgitation        Past Medical History:   Diagnosis Date   • Anxiety    • Arthritis     knees, hands   • Asthma    • Diabetes (CMS/HCC)     last a1c 6.1   • Fibromyalgia    • Fracture, radius    • GERD (gastroesophageal reflux disease)    • Hyperlipidemia    • Hypertension    • Hyperthyroidism    • Right knee pain     scheduled for TKA   • Sleep apnea     uses bipap   • Stage 3 chronic kidney disease (CMS/HCC)    • Stroke (CMS/HCC)     found on ct scan, no residuals         Past Surgical History:   Procedure Laterality Date   • APPENDECTOMY     • BLEPHAROPLASTY     • CARPAL TUNNEL RELEASE Right    • CERVICAL DISCECTOMY ANTERIOR     • HYSTERECTOMY      abd   • MASTOIDECTOMY     • TOTAL KNEE ARTHROPLASTY Right 2020    Procedure: TOTAL KNEE ARTHROPLASTY AND ALL ASSOCIATED PROCEDURES;  Surgeon: Sj Hannah MD;  Location: [facility];  Service: Orthopedics;  Laterality: Right;       PT Ortho     Row Name 21       Right Lower Ext    Rt Knee Extension/Flexion AROM  0-120 post stretch  -KM      User Key  (r) = Recorded By, (t) = Taken By, (c) = Cosigned By    Initials Name Provider Type    Sandie Holder PTA Physical Therapy Assistant                      PT Assessment/Plan     Row Name 21 09          PT Assessment     "Assessment Comments  Pt is making good progess toward goals with improved gait and function and measures increased AROM.  -KM        PT Plan    PT Plan Comments  Continue POC per MD. With pts improved status, plan to see pt 1x/week to progress as tolerated.   -KM       User Key  (r) = Recorded By, (t) = Taken By, (c) = Cosigned By    Initials Name Provider Type    Sandie Holder, LINDA Physical Therapy Assistant            OP Exercises     Row Name 02/12/21 0900             Subjective Comments    Subjective Comments  Pt states she feels \"she overdid it yesterday\" causing her knee to be sore today. States she was able to complete 11 steps multiple times.   -KM         Exercise 1    Exercise Name 1  Heel Slides  -KM      Cueing 1  Verbal;Tactile  -KM      Time 1  8 min  -KM         Exercise 2    Exercise Name 2  Wall Slides  -KM      Cueing 2  Verbal;Tactile  -KM      Time 2  5 min  -KM         Exercise 3    Exercise Name 3  Passive knee FLEX stretch  -KM      Cueing 3  Verbal;Tactile  -KM      Reps 3  10  -KM      Time 3  10 secs  -KM         Exercise 4    Exercise Name 4  Hamstring/gastroc stretch  -KM      Cueing 4  Verbal;Tactile  -KM      Reps 4  15  -KM      Time 4  10 secs  -KM         Exercise 5    Exercise Name 5  Supine knee EXT on roll  -KM      Cueing 5  Verbal;Tactile  -KM      Time 5  5 min  -KM         Exercise 6    Exercise Name 6  Passive knee EXT stretch  -KM      Cueing 6  Verbal;Tactile  -KM      Reps 6  10  -KM      Time 6  10 secs  -KM         Exercise 7    Exercise Name 7  SLR  -KM      Cueing 7  Verbal;Tactile  -KM      Reps 7  25  -KM         Exercise 8    Exercise Name 8  Hip ABD  -KM      Cueing 8  Verbal;Tactile  -KM      Reps 8  25  -KM         Exercise 9    Exercise Name 9  SAQ  -KM      Cueing 9  Verbal;Tactile  -KM      Reps 9  25  -KM         Exercise 10    Exercise Name 10  LAQ  -KM      Cueing 10  Verbal;Tactile  -KM      Reps 10  25  -KM         Exercise 11    Exercise Name 11  TKE " " -KM         Exercise 12    Exercise Name 12  Heel Raises   -KM      Reps 12  25  -KM         Exercise 13    Exercise Name 13  6\" Fwd Step Ups  -KM      Reps 13  10 each  -KM         Exercise 14    Exercise Name 14  Mini Squats   -KM      Reps 14  15  -KM        User Key  (r) = Recorded By, (t) = Taken By, (c) = Cosigned By    Initials Name Provider Type    Sandie Holder PTA Physical Therapy Assistant                                          Time Calculation:   Start Time: 0900  Stop Time: 0945  Time Calculation (min): 45 min  Therapy Charges for Today     Code Description Service Date Service Provider Modifiers Qty    22709441020 HC PT MANUAL THERAPY EA 15 MIN 2/12/2021 Sandie Arzate PTA GP 1    28838618418 HC PT THER PROC EA 15 MIN 2/12/2021 Sandie Arzate PTA GP 1                    Sandie Arzate PTA  2/12/2021     "

## 2021-02-17 ENCOUNTER — HOSPITAL ENCOUNTER (OUTPATIENT)
Dept: PHYSICAL THERAPY | Facility: HOSPITAL | Age: 76
Setting detail: THERAPIES SERIES
Discharge: HOME | End: 2021-02-17

## 2021-02-17 DIAGNOSIS — Z96.651 STATUS POST TOTAL RIGHT KNEE REPLACEMENT: Primary | ICD-10-CM

## 2021-02-17 PROCEDURE — 97110 THERAPEUTIC EXERCISES: CPT | Performed by: PHYSICAL THERAPIST

## 2021-02-17 NOTE — THERAPY RE-EVALUATION
Outpatient Physical Therapy Peds Re-Evaluation  STEPHEN Quiles     Patient Name: Jaz Harley  : 1945  MRN: [id number]  Today's Date: 2021       Visit Date: 2021     Patient Active Problem List   Diagnosis   • Tear of medial meniscus of right knee, current   • Stress fracture of right tibia   • Aortic valve sclerosis   • Benign essential HTN   • Thyroid disease   • Fibromyalgia   • GERD (gastroesophageal reflux disease)   • Hearing loss in left ear   • Hyperlipidemia   • Obesity   • Obstructive sleep apnea   • Pericardial effusion   • Tricuspid regurgitation   • Status post total right knee replacement, DOS 2020     Past Medical History:   Diagnosis Date   • Anxiety    • Arthritis     knees, hands   • Asthma    • Diabetes (CMS/HCC)     last a1c 6.1   • Fibromyalgia    • Fracture, radius    • GERD (gastroesophageal reflux disease)    • Hyperlipidemia    • Hypertension    • Hyperthyroidism    • Right knee pain     scheduled for TKA   • Sleep apnea     uses bipap   • Stage 3 chronic kidney disease (CMS/HCC)    • Stroke (CMS/HCC)     found on ct scan, no residuals      Past Surgical History:   Procedure Laterality Date   • APPENDECTOMY     • BLEPHAROPLASTY     • CARPAL TUNNEL RELEASE Right    • CERVICAL DISCECTOMY ANTERIOR     • HYSTERECTOMY      abd   • MASTOIDECTOMY     • TOTAL KNEE ARTHROPLASTY Right 2020    Procedure: TOTAL KNEE ARTHROPLASTY AND ALL ASSOCIATED PROCEDURES;  Surgeon: Sj Hannah MD;  Location: [facility];  Service: Orthopedics;  Laterality: Right;       Visit Dx:    ICD-10-CM ICD-9-CM   1. Status post total right knee replacement  Z96.651 V43.65           PT Ortho     Row Name 21 1100       Subjective Comments    Subjective Comments  Pt states her knee is feeling pretty good. She denies any real pain and states she is not having any difficulty with her ADL function.  -GC       Patellar Accessory Motions    Superior glide  Right:;WNL  -GC     Inferior glide  Right:;WNL  -GC    Medial glide  Right:;WNL  -GC    Lateral glide  Right:;WNL  -GC       Right Lower Ext    Rt Knee Extension/Flexion AROM  0-126 degrees  -GC      User Key  (r) = Recorded By, (t) = Taken By, (c) = Cosigned By    Initials Name Provider Type    GC Manohar Torres PT Physical Therapist                               OP Exercises     Row Name 02/17/21 1100             Subjective Comments    Subjective Comments  Pt states her knee is feeling pretty good. She denies any real pain and states she is not having any difficulty with her ADL function.  -GC         Exercise 1    Exercise Name 1  Heel Slides  -GC      Cueing 1  Verbal;Tactile  -GC      Time 1  8 min  -GC         Exercise 2    Exercise Name 2  Wall Slides  -GC      Cueing 2  Verbal;Tactile  -GC      Time 2  5 min  -GC         Exercise 3    Exercise Name 3  Passive knee FLEX stretch  -GC      Cueing 3  Verbal;Tactile  -GC      Reps 3  10  -GC      Time 3  10 secs  -GC         Exercise 4    Exercise Name 4  Hamstring/gastroc stretch  -GC      Cueing 4  Verbal;Tactile  -GC      Reps 4  15  -GC      Time 4  10 secs  -GC         Exercise 5    Exercise Name 5  Supine knee EXT on roll  -GC      Cueing 5  Verbal;Tactile  -GC      Time 5  5 min  -GC         Exercise 6    Exercise Name 6  Passive knee EXT stretch  -GC      Cueing 6  Verbal;Tactile  -GC      Reps 6  10  -GC      Time 6  10 secs  -GC         Exercise 7    Exercise Name 7  SLR  -GC      Cueing 7  Verbal;Tactile  -GC      Reps 7  25  -GC      Time 7  1#  -GC         Exercise 8    Exercise Name 8  Hip ABD  -GC      Cueing 8  Verbal;Tactile  -GC      Reps 8  25  -GC      Time 8  1#  -GC         Exercise 9    Exercise Name 9  SAQ  -GC      Cueing 9  Verbal;Tactile  -GC      Reps 9  25  -GC      Time 9  1#  -GC         Exercise 10    Exercise Name 10  LAQ  -GC      Cueing 10  Verbal;Tactile  -GC      Reps 10  25  -GC      Time 10  1#  -GC         Exercise 11    Exercise Name 11  TKE  " -         Exercise 12    Exercise Name 12  Heel Raises   -      Reps 12  25  -GC         Exercise 13    Exercise Name 13  6\" Fwd Step Ups  -      Reps 13  15 each  -         Exercise 14    Exercise Name 14  Mini Squats   -      Reps 14  20  -        User Key  (r) = Recorded By, (t) = Taken By, (c) = Cosigned By    Initials Name Provider Type     Manohar Torres, PT Physical Therapist                       PT OP Goals     Row Name 02/17/21 1100          PT Short Term Goals    STG Date to Achieve  02/10/21  -     STG 1  Decrease right knee pain to 1/10 with activity.  -     STG 1 Progress  Met  -     STG 2  Increase right knee AROM to 0-110 degrees with testing.  -     STG 2 Progress  Met  -     STG 3  Increase right LE strength to at least 4/5 all planes with testing.  -     STG 3 Progress  Met  -     STG 4  Pt will be independent with her HEP issued by this therapist.  -     STG 4 Progress  Met  -        Long Term Goals    LTG Date to Achieve  03/03/21  -     LTG 1  Decrease right knee pain to 0/10 with activity.  -     LTG 1 Progress  Partially Met  -     LTG 2  Increase right knee AROM to 0-125 degrees with testing.  -     LTG 2 Progress  Met  -     LTG 3  Increase right LE strength to at least 4+/5 all planes with testing.  -     LTG 3 Progress  Met  -     LTG 4  Pt will ambulate normally on levels and stairs without assistive device.  -     LTG 4 Progress  Met  -     LTG 5  Pt will be independnet with all ADLs and have a LEFS score > 65.  -     LTG 5 Progress  Partially Met  -       User Key  (r) = Recorded By, (t) = Taken By, (c) = Cosigned By    Initials Name Provider Type     Manohar Torres, PT Physical Therapist        PT Assessment/Plan     Row Name 02/17/21 1100          PT Assessment    Assessment Comments  Pt is doing well with most goals met. Feel that additional strengthening can be done with her HEP only. feel she no longer requires skilled " therapy services.  -GC        PT Plan    PT Plan Comments  Pt is to continue her HEP daily. She has MD follow up again in 5 weeks.  -GC       User Key  (r) = Recorded By, (t) = Taken By, (c) = Cosigned By    Initials Name Provider Type    Manohar Bowen, PT Physical Therapist                 Time Calculation:   Start Time: 1100  Stop Time: 1157  Time Calculation (min): 57 min  Therapy Charges for Today     Code Description Service Date Service Provider Modifiers Qty    [id number]  PT THER PROC EA 15 MIN 2/17/2021 Manohar Torres, PT GP 2                Manohar Torres, PT  2/17/2021

## 2021-03-24 ENCOUNTER — OFFICE VISIT (OUTPATIENT)
Dept: ORTHOPEDIC SURGERY | Facility: CLINIC | Age: 76
End: 2021-03-24

## 2021-03-24 VITALS — HEIGHT: 61 IN | RESPIRATION RATE: 18 BRPM | BODY MASS INDEX: 36.25 KG/M2 | WEIGHT: 192 LBS

## 2021-03-24 DIAGNOSIS — Z96.651 STATUS POST TOTAL RIGHT KNEE REPLACEMENT: Primary | ICD-10-CM

## 2021-03-24 PROCEDURE — 99024 POSTOP FOLLOW-UP VISIT: CPT | Performed by: ORTHOPAEDIC SURGERY

## 2021-03-24 RX ORDER — PREGABALIN 75 MG/1
75 CAPSULE ORAL 2 TIMES DAILY
Qty: 60 CAPSULE | Refills: 0 | Status: SHIPPED | OUTPATIENT
Start: 2021-03-24 | End: 2021-06-02

## 2021-03-24 NOTE — PROGRESS NOTES
CC: s/p right total knee arthroplasty, DOS 12/31/2020  Interval History: Jaz Harley returns for 12 week postoperative visit. STILL INTERMITTENT use of Lyrica for mild residual neuropathic pain medially. Mild pain posterolateral.   She is doing well. Pain is controlled with pain medication and is improving. Doing home exercises    Physical Examination: Right knee was examined   Incision well healed    ROM 0-125   Stable to varus and valgus stress at 0 to 30   Effusion - minimal   Flex/extend ankle and toes   Positive sensation right foot   No calf pain, negative Homans sign bilaterally    Assessment/Plan:  Jaz Harley is recovering from surgery as expected.  She is making good progress in regards to her range of motion and strength with her home exercises.  Prescription given for Voltaren gel to use over her topical areas of sensitivity as well as refill on Lyrica which she is taking on an intermittent basis.  Follow-up in 3 months with repeat x-rays of right knee.    Discussed with patient need for antibiotic prophylaxis for dental or endoscopy procedures.    Medications:  New Medications Ordered This Visit   Medications   • Diclofenac Sodium (VOLTAREN) 1 % gel gel     Sig: Apply 4 g topically to the appropriate area as directed 4 (Four) Times a Day As Needed (pain).     Dispense:  50 g     Refill:  0   • pregabalin (Lyrica) 75 MG capsule     Sig: Take 1 capsule by mouth 2 (Two) Times a Day.     Dispense:  60 capsule     Refill:  0

## 2021-06-01 DIAGNOSIS — Z96.651 STATUS POST TOTAL RIGHT KNEE REPLACEMENT: ICD-10-CM

## 2021-06-02 RX ORDER — PREGABALIN 75 MG/1
CAPSULE ORAL
Qty: 60 CAPSULE | Refills: 0 | Status: SHIPPED | OUTPATIENT
Start: 2021-06-02 | End: 2022-12-19

## 2021-06-16 ENCOUNTER — OFFICE VISIT (OUTPATIENT)
Dept: ORTHOPEDIC SURGERY | Facility: CLINIC | Age: 76
End: 2021-06-16

## 2021-06-16 VITALS — WEIGHT: 192 LBS | BODY MASS INDEX: 36.25 KG/M2 | HEIGHT: 61 IN

## 2021-06-16 DIAGNOSIS — M25.561 CHRONIC PAIN OF RIGHT KNEE: Primary | ICD-10-CM

## 2021-06-16 DIAGNOSIS — G89.29 CHRONIC PAIN OF RIGHT KNEE: Primary | ICD-10-CM

## 2021-06-16 DIAGNOSIS — Z96.651 STATUS POST TOTAL RIGHT KNEE REPLACEMENT: ICD-10-CM

## 2021-06-16 PROCEDURE — 99213 OFFICE O/P EST LOW 20 MIN: CPT | Performed by: ORTHOPAEDIC SURGERY

## 2021-06-16 PROCEDURE — 73562 X-RAY EXAM OF KNEE 3: CPT | Performed by: ORTHOPAEDIC SURGERY

## 2021-06-16 RX ORDER — GABAPENTIN 300 MG/1
300 CAPSULE ORAL 3 TIMES DAILY
Qty: 90 CAPSULE | Refills: 0 | Status: SHIPPED | OUTPATIENT
Start: 2021-06-16

## 2021-06-16 NOTE — PROGRESS NOTES
Subjective:     Patient ID: Jaz Harley is a 75 y.o. female.    Chief Complaint: f/u s/p right total knee arthroplasty, DOS 2020    History of Present Illness  Jaz Harley returns to clinic today for evaluation of right knee status post total knee arthroplasty. She is overall doing well at this time with minimal pain. She has begun to return to an active daily lifestyle. She has residual pain along the lateral right knee and is using topical Pennsaid cream and Vitamin E cream with moderate relief. She has begun a new issue of blurry vision, which started with her Lyrica. After discontinuing Lyrica, her vision returned to normal.     Social History     Occupational History   • Not on file   Tobacco Use   • Smoking status: Former Smoker     Quit date:      Years since quittin.4   • Smokeless tobacco: Never Used   Vaping Use   • Vaping Use: Never used   Substance and Sexual Activity   • Alcohol use: No   • Drug use: Never   • Sexual activity: Defer      Past Medical History:   Diagnosis Date   • Anxiety    • Arthritis     knees, hands   • Asthma    • Diabetes (CMS/HCC)     last a1c 6.1   • Fibromyalgia    • Fracture, radius    • GERD (gastroesophageal reflux disease)    • Hyperlipidemia    • Hypertension    • Hyperthyroidism    • Right knee pain     scheduled for TKA   • Sleep apnea     uses bipap   • Stage 3 chronic kidney disease (CMS/HCC)    • Stroke (CMS/HCC)     found on ct scan, no residuals      Past Surgical History:   Procedure Laterality Date   • APPENDECTOMY     • BLEPHAROPLASTY     • CARPAL TUNNEL RELEASE Right    • CERVICAL DISCECTOMY ANTERIOR     • HYSTERECTOMY      abd   • MASTOIDECTOMY     • TOTAL KNEE ARTHROPLASTY Right 2020    Procedure: TOTAL KNEE ARTHROPLASTY AND ALL ASSOCIATED PROCEDURES;  Surgeon: Sj Hannah MD;  Location: [facility];  Service: Orthopedics;  Laterality: Right;       Family History   Problem Relation Age of Onset   • Cancer Mother   "  • Cancer Father    • Diabetes Sister    • Cancer Sister          Review of Systems        Objective:  Vitals:    06/16/21 0938   Weight: 87.1 kg (192 lb)   Height: 153.7 cm (60.5\")         06/16/21 0938   Weight: 87.1 kg (192 lb)     Body mass index is 36.88 kg/m².  General: No acute distress.  Resp: normal respiratory effort  Skin: no rashes or wounds; normal turgor  Psych: mood and affect appropriate; recent and remote memory intact          Ortho Exam     Right Knee-    ROM 0-125 degrees  4+/5 on flexion  4+/5 on extension    Effusion- minimal   Stable opening on varus and valgus stress at 0 and 30    Positive sensation light tough all distributions symmetric to contralateral side  Brisk cap refill all digits  1+ dorsalis pedis pulse          Imaging:  Right Knee X-Ray  Indication: s/p total knee     AP, Lateral, and [place] views    Findings:  Total knee arthroplasty components are in stable position with acceptable overall alignment, no evidence of periprosthetic fracture, loosening, osteolysis, or reactive bone formation.    Compared to prior postoperative x-rays    Assessment:        1. Chronic pain of right knee    2. Status post total right knee replacement, DOS 12/31/2020           Plan:          1. Discussed treatment options at length with patient at today's visit including continued activity management, at-home exercises, and transitioning to new neuropathic medication.  2. Prescribe gabapentin. Discontinue Lyrica.  3. Advised patient to continue at-home exercise program for improvement in strength, range of motion and function with daily activities.  4. Follow-up in six months with right knee X-rays.       Jaz Harley was in agreement with plan and had all questions answered.     Orders:  Orders Placed This Encounter   Procedures   • XR Knee 3+ View With [place] Right       Medications:  New Medications Ordered This Visit   Medications   • gabapentin (NEURONTIN) 300 MG capsule     Sig: " Take 1 capsule by mouth 3 (Three) Times a Day.     Dispense:  90 capsule     Refill:  0       Followup:  Return in about 6 months (around 12/16/2021) for right knee X-rays.    Diagnoses and all orders for this visit:    1. Chronic pain of right knee (Primary)  -     XR Knee 3+ View With [place] Right  -     gabapentin (NEURONTIN) 300 MG capsule; Take 1 capsule by mouth 3 (Three) Times a Day.  Dispense: 90 capsule; Refill: 0    2. Status post total right knee replacement, DOS 12/31/2020  -     gabapentin (NEURONTIN) 300 MG capsule; Take 1 capsule by mouth 3 (Three) Times a Day.  Dispense: 90 capsule; Refill: 0        SCRIBE ATTESTATION:  I, Vinh Heath, attest that all medical record entries for this patient were documented by me acting as a medical scribe for Sj Hannah MD.    PROVIDER ATTESTATION:  ISj MD, personally performed the services described in this documentation. All medical record entries made by the scribe were at my direction and in my presence. I have reviewed the chart and discharge instructions and agree that the record reflects my personal performance and is accurate and complete.  Sj Hannah MD.    Electronically signed: Sj Hannah MD 6/16/2021 10:31 EDT       Dictated utilizing Dragon dictation

## 2021-12-16 ENCOUNTER — OFFICE VISIT (OUTPATIENT)
Dept: ORTHOPEDIC SURGERY | Facility: CLINIC | Age: 76
End: 2021-12-16

## 2021-12-16 VITALS — BODY MASS INDEX: 36.88 KG/M2 | WEIGHT: 192 LBS

## 2021-12-16 DIAGNOSIS — Z96.651 STATUS POST TOTAL RIGHT KNEE REPLACEMENT: Primary | ICD-10-CM

## 2021-12-16 PROCEDURE — 99212 OFFICE O/P EST SF 10 MIN: CPT | Performed by: ORTHOPAEDIC SURGERY

## 2021-12-16 PROCEDURE — 73562 X-RAY EXAM OF KNEE 3: CPT | Performed by: ORTHOPAEDIC SURGERY

## 2021-12-16 ASSESSMENT — KOOS JR
KOOS JR SCORE: 100
KOOS JR SCORE: 0

## 2021-12-16 NOTE — PROGRESS NOTES
Subjective:     Patient ID: Jaz Harley is a 76 y.o. female.    Chief Complaint:  Follow-up status post right total knee arthroplasty, 2020  History of Present Illness  Jaz Harley returns to clinic today for follow-up status post right total knee arthroplasty. The patient is doing well overall at this time with no new complaints of pain. She does express difficulty and discomfort with kneeling.     The patient also makes note of recent diagnosis of vertigo.      Social History     Occupational History   • Not on file   Tobacco Use   • Smoking status: Former Smoker     Quit date:      Years since quittin.0   • Smokeless tobacco: Never Used   Vaping Use   • Vaping Use: Never used   Substance and Sexual Activity   • Alcohol use: No   • Drug use: Never   • Sexual activity: Defer      Past Medical History:   Diagnosis Date   • Anxiety    • Arthritis     knees, hands   • Asthma    • Diabetes (CMS/HCC)     last a1c 6.1   • Fibromyalgia    • Fracture, radius    • GERD (gastroesophageal reflux disease)    • Hyperlipidemia    • Hypertension    • Hyperthyroidism    • Right knee pain     scheduled for TKA   • Sleep apnea     uses bipap   • Stage 3 chronic kidney disease (CMS/HCC)    • Stroke (CMS/HCC)     found on ct scan, no residuals      Past Surgical History:   Procedure Laterality Date   • APPENDECTOMY     • BLEPHAROPLASTY     • CARPAL TUNNEL RELEASE Right    • CERVICAL DISCECTOMY ANTERIOR     • HYSTERECTOMY      abd   • MASTOIDECTOMY     • TOTAL KNEE ARTHROPLASTY Right 2020    Procedure: TOTAL KNEE ARTHROPLASTY AND ALL ASSOCIATED PROCEDURES;  Surgeon: Sj Hannah MD;  Location: [facility];  Service: Orthopedics;  Laterality: Right;       Family History   Problem Relation Age of Onset   • Cancer Mother    • Cancer Father    • Diabetes Sister    • Cancer Sister          Review of Systems   Musculoskeletal: Positive for arthralgias and myalgias.           Objective:  Vitals:     12/16/21 0856   Weight: 87.1 kg (192 lb)         12/16/21  0856   Weight: 87.1 kg (192 lb)     Body mass index is 36.88 kg/m².  General: No acute distress.  Resp: normal respiratory effort  Skin: no rashes or wounds; normal turgor  Psych: mood and affect appropriate; recent and remote memory intact          Ortho Exam     Right knee-active range of motion 0 to 125 degrees, 4+ out of 5 strength on flexion and extension, stable to varus and valgus stress at 0 and 30 degrees, good endpoint on posterior drawer at 90 degrees of flexion.  No effusion, midline incision well-healed.  Positive sensation, 1+ dorsalis pedis pulse.    Imaging:  Right Knee X-Ray  Indication: s/p total knee     AP, Lateral, and [place] views    Findings:  Total knee arthroplasty components are in stable position with acceptable overall alignment, no evidence of periprosthetic fracture, loosening, osteolysis, or reactive bone formation.    Compared to prior postoperative x-rays    Assessment:        1. Status post total right knee replacement           Plan:        1. Discussed treatment options at length with patient at today's visit.   2.  Over the next 2 years, If she receives any dental work or undergoes a colonoscopy procedure I have requested that she contact me so that I can provide an antibiotic.  3. In regards to her difficulty kneeling, I suggested soft tissue massage to help decrease sensitivity,  padding over the area she plans to kneel, and use of a knee pad.  4. I will follow up with her in 1 year with repeat x-rays of the right knee. In the meantime, If she develops any issues she may contact me for reevaluation.     Jaz Harley was in agreement with plan and had all questions answered.     Orders:  Orders Placed This Encounter   Procedures   • XR Knee 3 View Right       Medications:  No orders of the defined types were placed in this encounter.      Followup:  Return in about 1 year (around 12/16/2022) for xrays needed  at follow up.    Diagnoses and all orders for this visit:    1. Status post total right knee replacement (Primary)  -     XR Knee 3 View Right      The patient has consented to being recorded using JASMINA    Dictated utilizing Dragon dictation     Transcribed from ambient dictation for Sj Hannah MD by Tania Whitlock.  12/16/21   11:09 EST    Patient verbalized consent to the visit recording.  I have personally performed the services described in this document as transcribed by the above individual, and it is both accurate and complete.  Sj Hannah MD  12/29/2021  21:17 EST

## 2022-12-19 ENCOUNTER — OFFICE VISIT (OUTPATIENT)
Dept: ORTHOPEDIC SURGERY | Facility: CLINIC | Age: 77
End: 2022-12-19

## 2022-12-19 DIAGNOSIS — Z96.651 STATUS POST TOTAL RIGHT KNEE REPLACEMENT: Primary | ICD-10-CM

## 2022-12-19 PROCEDURE — 99212 OFFICE O/P EST SF 10 MIN: CPT | Performed by: ORTHOPAEDIC SURGERY

## 2022-12-19 PROCEDURE — 73562 X-RAY EXAM OF KNEE 3: CPT | Performed by: ORTHOPAEDIC SURGERY

## 2022-12-19 NOTE — PROGRESS NOTES
Subjective:     Patient ID: Jaz Harley is a 77 y.o. female.    Chief Complaint:  Status post right total knee arthroplasty-2020    History of Present Illness  Jaz Harley returns to clinic today for evaluation of status post right total knee arthroplasty. The patient reports she is doing well overall, and notes improvement in her symptoms. She denies any fever, chills, sweats or any issues regarding her right knee. The patient denies any pain, except when she tries to do any kneeling, which she has experienced some irritation with that. She rates her pain at a 2 to 3 out of 10, but otherwise no issues with walking, getting up from a seated position, stair climbing, or her routine daily activities.    The patient reports her left knee is also doing well for her currently.       Social History     Occupational History   • Not on file   Tobacco Use   • Smoking status: Former     Types: Cigarettes     Quit date:      Years since quittin.0   • Smokeless tobacco: Never   Vaping Use   • Vaping Use: Never used   Substance and Sexual Activity   • Alcohol use: No   • Drug use: Never   • Sexual activity: Defer      Past Medical History:   Diagnosis Date   • Anxiety    • Arthritis     knees, hands   • Asthma    • Diabetes (HCC)     last a1c 6.1   • Fibromyalgia    • Fracture, radius    • GERD (gastroesophageal reflux disease)    • Hyperlipidemia    • Hypertension    • Hyperthyroidism    • Right knee pain     scheduled for TKA   • Sleep apnea     uses bipap   • Stage 3 chronic kidney disease (HCC)    • Stroke (HCC)     found on ct scan, no residuals      Past Surgical History:   Procedure Laterality Date   • APPENDECTOMY     • BLEPHAROPLASTY     • CARPAL TUNNEL RELEASE Right    • CERVICAL DISCECTOMY ANTERIOR     • HYSTERECTOMY      abd   • JOINT REPLACEMENT     • MASTOIDECTOMY     • TOTAL KNEE ARTHROPLASTY Right 2020    Procedure: TOTAL KNEE ARTHROPLASTY AND ALL ASSOCIATED PROCEDURES;   Surgeon: Sj Hannah MD;  Location: [facility];  Service: Orthopedics;  Laterality: Right;       Family History   Problem Relation Age of Onset   • Cancer Mother    • Cancer Father    • Diabetes Sister    • Cancer Sister          Review of Systems        Objective:  There were no vitals filed for this visit.  There were no vitals filed for this visit.  There is no height or weight on file to calculate BMI.  General: No acute distress.  Resp: normal respiratory effort  Skin: no rashes or wounds; normal turgor  Psych: mood and affect appropriate; recent and remote memory intact          Ortho Exam       Right Knee     Midline incision well healed.  No signs of erythema or fluctuance.  Active range of motion is 0 to 125 degrees.   Flexion strength- 4+/5.   Extension strength- 4+/5.   Effusion- None.   Posterior drawer- Good endpoint at 90 degrees.  Stable opening on varus and valgus stress at 0 and 30 degrees.     Imaging:  Right Knee X-Ray  Indication: s/p total knee     AP, Lateral, and [place] views    Findings:  Total knee arthroplasty components are in stable position with acceptable overall alignment, no evidence of periprosthetic fracture, loosening, osteolysis, or reactive bone formation.    Compared to prior postoperative x-rays    Assessment:        1. Status post total right knee replacement           Plan:        1. I discussed treatment options at length with patient at today's visit. The patient reports she is doing well at this point in time. She states she is very happy with her right knee. She mentions has had no issues with it in this regard and is continuing to be active at this point in time.  2. I will plan to see her back on an as needed basis if she gets some recurrent issues. Her left knee is also doing very well at this point in time.      Jaz Harley was in agreement with plan and had all questions answered.     Orders:  Orders Placed This Encounter   Procedures   • XR Knee 3+  View With [place] Right       Medications:  No orders of the defined types were placed in this encounter.      Followup:  Return if symptoms worsen or fail to improve.    Diagnoses and all orders for this visit:    1. Status post total right knee replacement (Primary)  -     XR Knee 3+ View With [place] Right        Transcribed from ambient dictation for Sj Hannah MD by Charlotte Cote.  12/19/22   10:35 EST

## 2024-12-30 ENCOUNTER — APPOINTMENT (OUTPATIENT)
Dept: GENERAL RADIOLOGY | Facility: HOSPITAL | Age: 79
End: 2024-12-30
Payer: MEDICARE

## 2024-12-30 ENCOUNTER — HOSPITAL ENCOUNTER (EMERGENCY)
Facility: HOSPITAL | Age: 79
Discharge: HOME | End: 2024-12-30
Attending: STUDENT IN AN ORGANIZED HEALTH CARE EDUCATION/TRAINING PROGRAM | Admitting: STUDENT IN AN ORGANIZED HEALTH CARE EDUCATION/TRAINING PROGRAM
Payer: MEDICARE

## 2024-12-30 VITALS
BODY MASS INDEX: 36.25 KG/M2 | OXYGEN SATURATION: 100 % | TEMPERATURE: 97.9 F | HEIGHT: 61 IN | DIASTOLIC BLOOD PRESSURE: 76 MMHG | RESPIRATION RATE: 16 BRPM | SYSTOLIC BLOOD PRESSURE: 150 MMHG | HEART RATE: 88 BPM | WEIGHT: 192.02 LBS

## 2024-12-30 DIAGNOSIS — S80.01XA TRAUMATIC HEMATOMA OF RIGHT KNEE, INITIAL ENCOUNTER: Primary | ICD-10-CM

## 2024-12-30 PROCEDURE — 99283 EMERGENCY DEPT VISIT LOW MDM: CPT | Performed by: STUDENT IN AN ORGANIZED HEALTH CARE EDUCATION/TRAINING PROGRAM

## 2024-12-30 PROCEDURE — 73562 X-RAY EXAM OF KNEE 3: CPT

## 2024-12-30 RX ORDER — HYDROCODONE BITARTRATE AND ACETAMINOPHEN 5; 325 MG/1; MG/1
1 TABLET ORAL EVERY 6 HOURS PRN
Qty: 12 TABLET | Refills: 0 | Status: SHIPPED | OUTPATIENT
Start: 2024-12-30 | End: 2024-12-30

## 2024-12-30 RX ORDER — HYDROCODONE BITARTRATE AND ACETAMINOPHEN 5; 325 MG/1; MG/1
1 TABLET ORAL EVERY 6 HOURS PRN
Qty: 12 TABLET | Refills: 0 | Status: SHIPPED | OUTPATIENT
Start: 2024-12-30

## 2024-12-30 RX ORDER — HYDROCODONE BITARTRATE AND ACETAMINOPHEN 5; 325 MG/1; MG/1
1 TABLET ORAL ONCE
Status: COMPLETED | OUTPATIENT
Start: 2024-12-30 | End: 2024-12-30

## 2024-12-30 RX ADMIN — HYDROCODONE BITARTRATE AND ACETAMINOPHEN 1 TABLET: 5; 325 TABLET ORAL at 21:12

## 2024-12-30 NOTE — Clinical Note
[facility] EMERGENCY DEPARTMENT  [address]  [place] [address]  Phone: [phone]    Jaz Harley was seen and treated in our emergency department on 12/30/2024.  She may return to work on 01/01/2025.         Thank you for choosing [facility].    Basil Gilliam MD

## 2024-12-31 NOTE — ED PROVIDER NOTES
Subjective   History of Present Illness  79-year-old female with past medical history of fibromyalgia, as well as a knee surgery in the right knee presenting with complaint of a fall ground-leve where she tripped and fell down on her right knee.  She states that she has has pain on her right knee as well as swelling l and a hematoma.  Denies any other symptoms including any other secondary injuries.  Family at bedside states the patient otherwise acting herself has no head pain did not hit her head has no neck pain        Review of Systems    Past Medical History:   Diagnosis Date    Anxiety     Arthritis     knees, hands    Asthma     Diabetes     last a1c 6.1    Fibromyalgia     Fracture, radius     GERD (gastroesophageal reflux disease)     Hyperlipidemia     Hypertension     Hyperthyroidism     Right knee pain     scheduled for TKA    Sleep apnea     uses bipap    Stage 3 chronic kidney disease     Stroke     found on ct scan, no residuals        Allergies   Allergen Reactions    Penicillins Hives    Sulfa Antibiotics Rash    Trimethoprim Hives    Ibuprofen Other (See Comments)     Stage 3 kidney disease    Diazepam Rash       Past Surgical History:   Procedure Laterality Date    APPENDECTOMY      BLEPHAROPLASTY      CARPAL TUNNEL RELEASE Right     CERVICAL DISCECTOMY ANTERIOR      HYSTERECTOMY      abd    JOINT REPLACEMENT      MASTOIDECTOMY      TOTAL KNEE ARTHROPLASTY Right 2020    Procedure: TOTAL KNEE ARTHROPLASTY AND ALL ASSOCIATED PROCEDURES;  Surgeon: Sj Hannah MD;  Location: [facility];  Service: Orthopedics;  Laterality: Right;       Family History   Problem Relation Age of Onset    Cancer Mother     Cancer Father     Diabetes Sister     Cancer Sister        Social History     Socioeconomic History    Marital status:    Tobacco Use    Smoking status: Former     Current packs/day: 0.00     Types: Cigarettes     Quit date:      Years since quittin.0    Smokeless tobacco:  Never   Vaping Use    Vaping status: Never Used   Substance and Sexual Activity    Alcohol use: No    Drug use: Never    Sexual activity: Defer           Objective   Physical Exam  Vitals and nursing note reviewed. Exam conducted with a chaperone present.   Constitutional:       General: She is not in acute distress.     Appearance: Normal appearance. She is not ill-appearing, toxic-appearing or diaphoretic.   HENT:      Head: Normocephalic and atraumatic.      Nose: Nose normal.      Mouth/Throat:      Pharynx: No oropharyngeal exudate or posterior oropharyngeal erythema.   Eyes:      Extraocular Movements: Extraocular movements intact.      Conjunctiva/sclera: Conjunctivae normal.      Pupils: Pupils are equal, round, and reactive to light.   Cardiovascular:      Rate and Rhythm: Normal rate and regular rhythm.   Pulmonary:      Effort: Pulmonary effort is normal. No respiratory distress.      Breath sounds: Normal breath sounds. No stridor. No wheezing, rhonchi or rales.   Abdominal:      General: Abdomen is flat. There is no distension.      Tenderness: There is no abdominal tenderness. There is no guarding or rebound.   Musculoskeletal:         General: Swelling and tenderness present. No deformity or signs of injury. Normal range of motion.      Cervical back: Normal range of motion.      Comments: Significant hematoma and ecchymosis around the right knee, diffuse tenderness, compartments soft in the tibia-fibula as well as the femur region   Skin:     General: Skin is warm and dry.      Capillary Refill: Capillary refill takes less than 2 seconds.      Findings: No erythema or rash.   Neurological:      General: No focal deficit present.      Mental Status: She is alert and oriented to person, place, and time. Mental status is at baseline.      Cranial Nerves: No cranial nerve deficit.      Sensory: No sensory deficit.      Motor: No weakness.   Psychiatric:         Mood and Affect: Mood normal.          Procedures           ED Course                                                       Medical Decision Making  Patient in for evaluation of a ground-level fall resulting in a significant hematoma on the right knee found to have no fracture or damage to the hardware of the right knee.  She had difficulty with ambulation she has crutches at home that the family have and confirmed at bedside.  Gave her a hydrocodone  for pain control and counseled patient on utility of calf exercises to prevent DVT    Problems Addressed:  Traumatic hematoma of right knee, initial encounter: complicated acute illness or injury    Amount and/or Complexity of Data Reviewed  Radiology: ordered.    Risk  Prescription drug management.        Final diagnoses:   Traumatic hematoma of right knee, initial encounter       ED Disposition  ED Disposition       ED Disposition   Discharge    Condition   Stable    Comment   --               Reyna Peters MD  [address] [facility] [address]  [place] [facility]  [phone]    In 3 days      [facility]  [address] [facility] [address] [phone]  Go to   If symptoms worsen -including expanding area of the hematoma, any new falls         Medication List        New Prescriptions      HYDROcodone-acetaminophen 5-325 MG per tablet  Commonly known as: NORCO  Take 1 tablet by mouth Every 6 (Six) Hours As Needed for Severe Pain or Moderate Pain.     naloxone 4 MG/0.1ML nasal spray  Commonly known as: NARCAN  Call 911. Don't prime. [place] in 1 nostril for overdose. Repeat in 2-3 minutes in other nostril if no or minimal breathing/responsiveness.               Where to Get Your Medications        These medications were sent to [facility] - [place], [place] - [address] [place] [address] - [phone]  - [phone] [address] [address]      Phone: [phone]   HYDROcodone-acetaminophen 5-325 MG per tablet  naloxone 4 MG/0.1ML nasal  Basil Flynn MD  12/30/24 [id number]

## 2024-12-31 NOTE — ED NOTES
Pt presents to ED via PV with family stating she tripped on the door frame and fell on her knees. Pt c/o right Knee pain and bruising with pain while bearing weight on it. Pt states she had a knee replacement on that knee several years ago.

## 2025-01-02 ENCOUNTER — TELEPHONE (OUTPATIENT)
Dept: ORTHOPEDIC SURGERY | Facility: CLINIC | Age: 80
End: 2025-01-02
Payer: MEDICARE

## 2025-01-02 NOTE — TELEPHONE ENCOUNTER
CALLED AND SPOKE TO PATIENT. HER AFTER VISIT SUMMARY STATES SHE SHOULD FOLLOW UP WITH HER PRIMARY CARE DOCTOR FOR THE BLISTERS ON HER KNEE. I EXPLAINED TO THE PATIENT IF SHE STARTS TO HAVE KNEE PAIN OR IF HER PRIMARY CARE THINKS SOMETHING INTERNAL IS WRONG WITH HER REPLACEMENT, TO CALL OUR OFFICE BACK AND WE CAN MAKE HER AN APPOINTMENT.

## 2025-01-02 NOTE — TELEPHONE ENCOUNTER
Caller: Jaz Harley    Relationship to patient: Self    Best call back number: [phone] (home)     Chief complaint: Traumatic hematoma of right knee, initial encounter - PT STATED THERE IS A BLISTER THE SIZE OF A QUARTER ON HER KNEE    Type of visit: NEW PROBLEM    Requested date: ASAP - PT DID NOT WANT TO WAIT UNTIL THE NEXT AVAILABLE APPT ON 1/29/25

## 2025-01-08 ENCOUNTER — TELEPHONE (OUTPATIENT)
Dept: ORTHOPEDIC SURGERY | Facility: CLINIC | Age: 80
End: 2025-01-08

## 2025-01-08 NOTE — TELEPHONE ENCOUNTER
Caller: GAGE    Relationship: DAUGHTER    Best call back number: [phone] (DAUGHTER) [phone] (PT)    What was the call regarding: PT'S DAUGHTER CALLING TO RELAY THAT PT HAD A FALL  ON 12/30/24 AND WENT TO THE ER AS A RESULT. PT INJURED R KNEE. PT HAD R KNEE PREVIOUSLY REPLACED BY DR CALDERÓN. PT'S DAUGHTER STATES THAT PT WAS NOT DIAGNOSED WITH ANYTHING BESIDES A CONTUSION ON THE R KNEE. PT IS CONCERNED THERE MAY BE SOMETHING ELSE GOING ON, AS PT'S R KNEE IS STILL VERY SWOLLEN AND HAS SEVERE BRUISING. PT'S DAUGHTER WOULD ALSO LIKE TO KNOW IF THERE IS SOME WAY FOR HER TO SEND PICTURES OF THE R KNEE.     PLEASE CONTACT PT'S DAUGHTER ABOVE TO DISCUSS. PLEASE CONTACT PT IF PT'S DAUGHTER DOES NOT ANSWER.

## 2025-01-09 ENCOUNTER — OFFICE VISIT (OUTPATIENT)
Dept: ORTHOPEDIC SURGERY | Facility: CLINIC | Age: 80
End: 2025-01-09
Payer: MEDICARE

## 2025-01-09 VITALS
WEIGHT: 192 LBS | DIASTOLIC BLOOD PRESSURE: 81 MMHG | HEIGHT: 61 IN | HEART RATE: 86 BPM | BODY MASS INDEX: 36.25 KG/M2 | SYSTOLIC BLOOD PRESSURE: 138 MMHG

## 2025-01-09 DIAGNOSIS — M70.41 PREPATELLAR BURSITIS OF RIGHT KNEE: Primary | ICD-10-CM

## 2025-01-09 DIAGNOSIS — Z96.651 HISTORY OF TOTAL KNEE ARTHROPLASTY, RIGHT: ICD-10-CM

## 2025-01-09 RX ORDER — GABAPENTIN 100 MG/1
CAPSULE ORAL
COMMUNITY
Start: 2025-01-03

## 2025-01-09 RX ORDER — PREDNISONE 10 MG/1
TABLET ORAL
Qty: 21 TABLET | Refills: 0 | Status: SHIPPED | OUTPATIENT
Start: 2025-01-09

## 2025-01-09 RX ORDER — ATORVASTATIN CALCIUM 10 MG/1
1 TABLET, FILM COATED ORAL DAILY
COMMUNITY
Start: 2024-10-22

## 2025-01-09 RX ORDER — VENLAFAXINE 75 MG/1
TABLET ORAL
COMMUNITY
Start: 2024-11-21

## 2025-01-09 RX ORDER — GLIMEPIRIDE 4 MG/1
1 TABLET ORAL EVERY 12 HOURS SCHEDULED
COMMUNITY
Start: 2024-12-16

## 2025-01-09 NOTE — PROGRESS NOTES
Subjective:     Patient ID: Jaz Harley is a 79 y.o. female.    Chief Complaint:  Fall anterior knee pain  Status post total knee arthroplasty, DOS 2020  History of Present Illness  History of Present Illness   no evidence ofThe patient is an 79-year-old female who presents to the clinic today for evaluation of her right knee.    She was at [facility] when she fell, striking the anterior aspect of her knee. She began experiencing discomfort; however, when she returned home and rested, she noted significant swelling. She was encouraged to follow up by family at the ER. She did present to the ER on 2024 for evaluation. X-ray images were completed and are available for review in the chart. She has no prior history of any injury to the knee. She is currently ambulating with the use of a cane. She also believes she injured her ribs, but she is currently taking Tylenol. She is unable to completely flex the knee but is able to extend it. She rates her discomfort as 4 out of 10. She reports redness, swelling, bruising, and stiffness of the knee. The bruising seems to have decreased somewhat. She is experiencing pain with standing and ambulatory activities. She has tried ice, rest, and medication. She does not endorse any other concerns at present. She was started on some pain medication and took 4 doses out of 12. She has a history of total knee arthroplasty of the right lower extremity on 2020 and has done quite well.    MEDICATIONS  Current: Tylenol    Social History     Occupational History    Not on file   Tobacco Use    Smoking status: Former     Current packs/day: 0.00     Types: Cigarettes     Quit date: 1980     Years since quittin.0     Passive exposure: Past    Smokeless tobacco: Never   Vaping Use    Vaping status: Never Used   Substance and Sexual Activity    Alcohol use: No    Drug use: Never    Sexual activity: Defer      Past Medical History:   Diagnosis Date    Anxiety  "    Arthritis     knees, hands    Asthma     Diabetes     last a1c 6.1    Fibromyalgia     Fracture, radius     GERD (gastroesophageal reflux disease)     Hyperlipidemia     Hypertension     Hyperthyroidism     Right knee pain     scheduled for TKA    Sleep apnea     uses bipap    Stage 3 chronic kidney disease     Stroke     found on ct scan, no residuals      Past Surgical History:   Procedure Laterality Date    APPENDECTOMY      BLEPHAROPLASTY      CARPAL TUNNEL RELEASE Right     CERVICAL DISCECTOMY ANTERIOR      HYSTERECTOMY      abd    JOINT REPLACEMENT      MASTOIDECTOMY      TOTAL KNEE ARTHROPLASTY Right 12/31/2020    Procedure: TOTAL KNEE ARTHROPLASTY AND ALL ASSOCIATED PROCEDURES;  Surgeon: Sj Hannah MD;  Location: [facility];  Service: Orthopedics;  Laterality: Right;       Family History   Problem Relation Age of Onset    Cancer Mother     Cancer Father     Diabetes Sister     Cancer Sister                Objective:  Physical Exam    Vital signs reviewed.   General: No acute distress.  Eyes: conjunctiva clear; pupils equally round and reactive  ENT: external ears and nose atraumatic; oropharynx clear  CV: no peripheral edema  Resp: normal respiratory effort  Skin: no rashes or wounds; normal turgor  Psych: mood and affect appropriate; recent and remote memory intact    Vitals:    01/09/25 0951   BP: 138/81   Pulse: 86   Weight: 87.1 kg (192 lb)   Height: 153.7 cm (60.5\")         01/09/25  0951   Weight: 87.1 kg (192 lb)     Body mass index is 36.88 kg/m².      Ortho Exam     Physical Exam  Right knee examined  Severe swelling prepatellar bursa with scattered ecchymosis along the anterior posterior aspect of the knee  Knee extension 0 degrees flexion and 90 degrees  Stable to varus and valgus stress testing  Can complete straight leg raise    Imaging:    XR Knee 3 View Right    Result Date: 12/30/2024  Impression: Right knee series demonstrating no acute bony abnormality. Total articular " prosthesis in place. Marked prepatellar soft tissue swelling. Electronically Signed: Harsha Webster MD  2024 8:25 PM EST  Workstation ID: [id number]     Independently reviewed three-view x-ray imaging right knee implant with good positioning no evidence of fracture, no evidence of loosening no evidence of osteolysis, severe prepatellar bursal swelling  Assessment:        1. Prepatellar bursitis of right knee    2. History of total knee arthroplasty, right         Assessment & Plan  1. Right knee prepatellar bursal with traumatic hematoma.  A comprehensive discussion regarding the plan of care was conducted. The condition of the knee was thoroughly discussed. A transition to heat therapy was recommended to facilitate the reabsorption of blood. The initiation of an oral steroid taper was proposed to potentially enhance symptom improvement, with the first dose to be administered early in the morning. The option of aspiration was considered; however, given the previous total knee arthroplasty and the associated increased risk of infection, this procedure will only be performed if absolutely necessary. It was explained that the body should naturally reabsorb the swelling, but this will be reassessed in 2 weeks. She is encouraged to contact us with any questions or concerns. All queries were addressed satisfactorily.    Follow-up  The patient will follow up in the clinic in 2 weeks, with repeat x-ray images of the knee. She is welcome to schedule an earlier appointment if necessary.    PROCEDURE  Total knee arthroplasty of the right lower extremity on 2020.      Orders:  No orders of the defined types were placed in this encounter.    New Medications Ordered This Visit   Medications    predniSONE (DELTASONE) 10 MG tablet     Si tabs day 1 by mouth with breakfast, 5 tabs day 2 by mouth with breakfast, 4 tabs day 3 by mouth with breakfast, 3 tabs day 4 by mouth with breakfast, 2 tabs day 5 by mouth with  breakfast, 1 tab day 6 by mouth with food     Dispense:  21 tablet     Refill:  0           Dragon dictation utilized  Class 2 Severe Obesity (BMI >=35 and <=39.9). Obesity-related health conditions include the following: osteoarthritis. Obesity is newly identified. BMI is is above average; BMI management plan is completed. We discussed portion control, increasing exercise, and Information on healthy weight added to patient's after visit summary.       Patient or patient representative verbalized consent for the use of Ambient Listening during the visit with  ARMIDA Abbott for chart documentation. 1/9/2025  10:30 EST

## 2025-01-24 ENCOUNTER — OFFICE VISIT (OUTPATIENT)
Dept: ORTHOPEDIC SURGERY | Facility: CLINIC | Age: 80
End: 2025-01-24
Payer: MEDICARE

## 2025-01-24 VITALS — HEIGHT: 61 IN | BODY MASS INDEX: 36.25 KG/M2 | WEIGHT: 192 LBS

## 2025-01-24 DIAGNOSIS — Z96.651 HISTORY OF TOTAL KNEE ARTHROPLASTY, RIGHT: ICD-10-CM

## 2025-01-24 DIAGNOSIS — M70.41 PREPATELLAR BURSITIS OF RIGHT KNEE: Primary | ICD-10-CM

## 2025-01-24 RX ORDER — DOXYCYCLINE 100 MG/1
100 CAPSULE ORAL 2 TIMES DAILY
Qty: 20 CAPSULE | Refills: 0 | Status: SHIPPED | OUTPATIENT
Start: 2025-01-24

## 2025-01-24 NOTE — PROGRESS NOTES
Subjective:     Patient ID: Jaz Harley is a 79 y.o. female.    Chief Complaint:  Follow-up traumatic hematoma right knee  Status post total knee arthroplasty 2020  History of Present Illness  History of Present Illness  The patient presents to the clinic today for reevaluation of her right knee.    She fell, striking the anterior aspect of the knee on 2024. She continued to exhibit significant swelling and a hematoma across the anterior aspect of the knee. She did complete an oral steroid course without much significant symptom improvement. She is experiencing pain with extension and flexion of the knee and significant bruising. She has tried gentle soft tissue massage and heat from a heating blanket without any significant symptom improvement. She presents today for evaluation. She reports no other concerns at present.         Social History     Occupational History    Not on file   Tobacco Use    Smoking status: Former     Current packs/day: 0.00     Types: Cigarettes     Quit date:      Years since quittin.0     Passive exposure: Past    Smokeless tobacco: Never   Vaping Use    Vaping status: Never Used   Substance and Sexual Activity    Alcohol use: No    Drug use: Never    Sexual activity: Defer      Past Medical History:   Diagnosis Date    Anxiety     Arthritis     knees, hands    Asthma     Diabetes     last a1c 6.1    Fibromyalgia     Fracture, radius     GERD (gastroesophageal reflux disease)     Hyperlipidemia     Hypertension     Hyperthyroidism     Right knee pain     scheduled for TKA    Sleep apnea     uses bipap    Stage 3 chronic kidney disease     Stroke     found on ct scan, no residuals      Past Surgical History:   Procedure Laterality Date    APPENDECTOMY      BLEPHAROPLASTY      CARPAL TUNNEL RELEASE Right     CERVICAL DISCECTOMY ANTERIOR      HYSTERECTOMY      abd    JOINT REPLACEMENT      MASTOIDECTOMY      TOTAL KNEE ARTHROPLASTY Right 2020     "Procedure: TOTAL KNEE ARTHROPLASTY AND ALL ASSOCIATED PROCEDURES;  Surgeon: Sj Hannah MD;  Location: [facility];  Service: Orthopedics;  Laterality: Right;       Family History   Problem Relation Age of Onset    Cancer Mother     Cancer Father     Diabetes Sister     Cancer Sister                Objective:  Physical Exam  General: No acute distress.  Eyes: conjunctiva clear; pupils equally round and reactive  ENT: external ears and nose atraumatic; oropharynx clear  CV: no peripheral edema  Resp: normal respiratory effort  Skin: no rashes or wounds; normal turgor  Psych: mood and affect appropriate; recent and remote memory intact    Vitals:    01/24/25 0908   Weight: 87.1 kg (192 lb)   Height: 153.7 cm (60.5\")         01/24/25  0908   Weight: 87.1 kg (192 lb)     Body mass index is 36.88 kg/m².      Ortho Exam     Physical Exam    Right knee examined  Knee range of motion 0 to 90 degrees  Stable to varus and valgus stress at 0 degrees and 30 degrees  Traumatic prepatellar bursal hematoma with significant ecchymosis  Negative calf tenderness, negative Homans' sign  Incision clean dry and intact well-healed  No evidence of erythema    Imaging:  Right Knee X-Ray  Indication: Pain    AP, Lateral, and [place] views    Findings:  TKA implant with good positioning, no evidence of loosening, no evidence of periprosthetic fracture, soft tissue swelling with hematoma along anterior aspect of knee     prior studies were available for comparison.    Assessment:        1. Prepatellar bursitis of right knee    2. History of total knee arthroplasty, right         Assessment & Plan  1. Right knee hematoma.  A comprehensive discussion was held regarding potential treatment strategies. The conversation included the topic of blood thickening and the hematoma present in the knee. She expressed a preference for aspiration as a treatment option. It was mutually agreed that an injection into the knee would not be pursued due to " her previous total knee arthroplasty. An oral antibiotic regimen will be initiated, to be taken over a 10-day period. The aspiration procedure yielded approximately 0.5 mL of blood, along with several clots. This resulted in a softening of the hematoma and an improvement in her ability to flex the knee. She is advised to persist with soft tissue massage to facilitate further improvement and is encouraged to contact the clinic sooner if necessary. All queries were addressed satisfactorily. A follow-up appointment is scheduled for 10 days from now for reevaluation. If she experiences any escalation in pain or swelling, she is welcome to return to the clinic.    Follow-up  The patient will follow up in 10 days.    PROCEDURE  Aspiration of the right knee yielded approximately 0.5 mL of blood and several clots, resulting in a softening of the hematoma and improved knee flexion.    Plan:  Large Joint Arthrocentesis: R knee  Date/Time: 1/24/2025 10:07 AM  Consent given by: patient  Site marked: site marked  Timeout: Immediately prior to procedure a time out was called to verify the correct patient, procedure, equipment, support staff and site/side marked as required   Supporting Documentation  Indications: pain   Procedure Details  Location: knee - R knee  Preparation: Patient was prepped and draped in the usual sterile fashion  Needle size: 18 G  Approach: anteromedial  Aspirate amount: 0.5 mL  Aspirate: bloody  Patient tolerance: patient tolerated the procedure well with no immediate complications        Orders:  Orders Placed This Encounter   Procedures    Large Joint Arthrocentesis: R knee    XR Knee 3 View Right     New Medications Ordered This Visit   Medications    doxycycline (VIBRAMYCIN) 100 MG capsule     Sig: Take 1 capsule by mouth 2 (Two) Times a Day.     Dispense:  20 capsule     Refill:  0         Dragon dictation utilized          Patient or patient representative verbalized consent for the use of Ambient  Listening during the visit with  ARMIDA Abbott for chart documentation. 1/25/2025  07:22 EST

## 2025-02-03 ENCOUNTER — OFFICE VISIT (OUTPATIENT)
Dept: ORTHOPEDIC SURGERY | Facility: CLINIC | Age: 80
End: 2025-02-03
Payer: MEDICARE

## 2025-02-03 VITALS — HEIGHT: 61 IN | BODY MASS INDEX: 36.25 KG/M2 | WEIGHT: 192 LBS

## 2025-02-03 DIAGNOSIS — Z96.651 HISTORY OF TOTAL KNEE ARTHROPLASTY, RIGHT: ICD-10-CM

## 2025-02-03 DIAGNOSIS — M70.41 PREPATELLAR BURSITIS OF RIGHT KNEE: Primary | ICD-10-CM

## 2025-02-03 PROCEDURE — 99213 OFFICE O/P EST LOW 20 MIN: CPT | Performed by: NURSE PRACTITIONER

## 2025-02-03 NOTE — PROGRESS NOTES
Subjective:     Patient ID: Jaz Harley is a 79 y.o. female.    Chief Complaint:  Follow-up traumatic hematoma right knee  Status post total knee arthroplasty 2020  History of Present Illness  History of Present Illness  The patient returns to the clinic today for a follow-up of her right knee.    She has started and continued taking the antibiotic, which she is tolerating fairly well. She has also tolerated the aspiration well. She has noted a reduction in swelling and is very pleased with the improvement in her symptoms at this time. She reports improvement in pain, stiffness, and tightness of the knee, as well as improved motion. She does not experience any locking, catching, or giving way of the knee. There is no erythema or paresthesia of the lower extremity. She has no other concerns at present.       Social History     Occupational History    Not on file   Tobacco Use    Smoking status: Former     Current packs/day: 0.00     Types: Cigarettes     Quit date:      Years since quittin.1     Passive exposure: Past    Smokeless tobacco: Never   Vaping Use    Vaping status: Never Used   Substance and Sexual Activity    Alcohol use: No    Drug use: Never    Sexual activity: Defer      Past Medical History:   Diagnosis Date    Anxiety     Arthritis     knees, hands    Asthma     Diabetes     last a1c 6.1    Fibromyalgia     Fracture, radius     GERD (gastroesophageal reflux disease)     Hyperlipidemia     Hypertension     Hyperthyroidism     Right knee pain     scheduled for TKA    Sleep apnea     uses bipap    Stage 3 chronic kidney disease     Stroke     found on ct scan, no residuals      Past Surgical History:   Procedure Laterality Date    APPENDECTOMY      BLEPHAROPLASTY      CARPAL TUNNEL RELEASE Right     CERVICAL DISCECTOMY ANTERIOR      HYSTERECTOMY      abd    JOINT REPLACEMENT      MASTOIDECTOMY      TOTAL KNEE ARTHROPLASTY Right 2020    Procedure: TOTAL KNEE ARTHROPLASTY  "AND ALL ASSOCIATED PROCEDURES;  Surgeon: Sj Hannah MD;  Location: [facility];  Service: Orthopedics;  Laterality: Right;       Family History   Problem Relation Age of Onset    Cancer Mother     Cancer Father     Diabetes Sister     Cancer Sister                Objective:  Physical Exam  General: No acute distress.  Eyes: conjunctiva clear; pupils equally round and reactive  ENT: external ears and nose atraumatic; oropharynx clear  CV: no peripheral edema  Resp: normal respiratory effort  Skin: no rashes or wounds; normal turgor  Psych: mood and affect appropriate; recent and remote memory intact    Vitals:    02/03/25 0821   Weight: 87.1 kg (192 lb)   Height: 153.7 cm (60.5\")         02/03/25  0821   Weight: 87.1 kg (192 lb)     Body mass index is 36.88 kg/m².      Right Knee Exam     Tests   Varus: negative Valgus: negative    Other   Erythema: absent  Sensation: normal  Pulse: present    Comments:  Incision clean dry and intact well-healed  No evidence of erythema no evidence of drainage of the knee  Knee range of motion 0 to 115 degrees  Positive soft tissue swelling prepatellar bursa with scattered ecchymosis  Positive sensation light touch all distributions right lower extremity             Physical Exam      Assessment:        1. Prepatellar bursitis of right knee    2. History of total knee arthroplasty, right         Assessment & Plan  1. Right knee pain.  She has shown significant improvement in her condition, with reduced swelling and enhanced mobility. She reports no locking, catching, or giving way of the knee, and there is no erythema or paresthesia of the lower extremity. The proposed treatment plan involves the continuation of soft tissue massage at the prepatellar bursa, along with the application of alternating ice and heat. She is advised to complete the course of oral antibiotics. It was explained that the body will naturally reabsorb the residual swelling. However, should she experience " any redness, or an escalation in pain or swelling, she is to return to the clinic immediately. For now, she is to maintain her current home care regimen. All her queries were addressed satisfactorily.      Orders:  No orders of the defined types were placed in this encounter.    No orders of the defined types were placed in this encounter.        Dragon dictation utilized          Patient or patient representative verbalized consent for the use of Ambient Listening during the visit with  ARMIDA Abbott for chart documentation. 2/3/2025  08:32 EST

## (undated) DEVICE — SCRW HEX PERSONA FML 2.5X25MM PK/2
Type: IMPLANTABLE DEVICE | Site: KNEE | Status: NON-FUNCTIONAL
Removed: 2020-12-31

## (undated) DEVICE — CAP GUIDE PSI/SIGNATURE/I-ASSIST

## (undated) DEVICE — 3M™ STERI-DRAPE™ U-DRAPE 1015: Brand: STERI-DRAPE™

## (undated) DEVICE — CEMENT MIXING SYSTEM WITH FEMORAL BREAKWAY NOZZLE: Brand: REVOLUTION

## (undated) DEVICE — PK KN TOTL 90

## (undated) DEVICE — COLD THERAPY BLANKET: Brand: DEROYAL

## (undated) DEVICE — Device

## (undated) DEVICE — SUT VIC 0 CT1 36IN J946H

## (undated) DEVICE — PUMP PAIN AUTOFUSER AUTO SELCT NOBOLUS 1TO14ML/HR 550ML DISP

## (undated) DEVICE — TRAP FLD MINIVAC MEGADYNE 100ML

## (undated) DEVICE — DRSNG TELFA PAD NONADH STR 1S 3X8IN

## (undated) DEVICE — HOOD, PEEL-AWAY: Brand: FLYTE

## (undated) DEVICE — IMMOB KN 3PNL DLX CANVS 16IN BLU

## (undated) DEVICE — PREP SOL POVIDONE/IODINE BT 4OZ

## (undated) DEVICE — GLV SURG SENSICARE PI LF PF 7.5

## (undated) DEVICE — SYS SKIN CLS DERMABOND PRINEO W/22CM MESH TP

## (undated) DEVICE — WEBRIL* CAST PADDING: Brand: DEROYAL

## (undated) DEVICE — MAT FLR ABSORBENT LG 4FT 10 2.5FT

## (undated) DEVICE — PENCL E/S ULTRAVAC TELESCP NOSE HOLSTR 10FT

## (undated) DEVICE — GLV SURG NEOLON 2G PF LF 7.5 STRL

## (undated) DEVICE — INTENDED USE FOR SURGICAL MARKING ON INTACT SKIN, ALSO PROVIDES A PERMANENT METHOD OF IDENTIFYING OBJECTS IN THE OPERATING ROOM: Brand: WRITESITE® REGULAR TIP SKIN MARKER

## (undated) DEVICE — DISPOSABLE TOURNIQUET CUFF SINGLE BLADDER, SINGLE PORT AND LUER LOCK CONNECTOR: Brand: COLOR CUFF

## (undated) DEVICE — FRAZIER SURGICAL SUCTION INSTRUMENT: Brand: ARGYLE

## (undated) DEVICE — DUAL CUT SAGITTAL BLADE

## (undated) DEVICE — SOL ISO/ALC RUB 70PCT 4OZ

## (undated) DEVICE — INTENDED FOR TISSUE SEPARATION, AND OTHER PROCEDURES THAT REQUIRE A SHARP SURGICAL BLADE TO PUNCTURE OR CUT.: Brand: BARD-PARKER ® STAINLESS STEEL BLADES

## (undated) DEVICE — KT CATH NERV BLCK ONQ QUIKBLCK 20GA 100MM

## (undated) DEVICE — GLV SURG SENSICARE W/ALOE PF LF 7.5 STRL

## (undated) DEVICE — SPNG GZ WOVN 4X4IN 12PLY 10/BX STRL

## (undated) DEVICE — APPL CHLORAPREP HI/LITE 26ML ORNG

## (undated) DEVICE — FOAM BUMP ROUND LARGE: Brand: MEDLINE INDUSTRIES, INC.

## (undated) DEVICE — SCRW HEX CORT HD 3.5X38MM
Type: IMPLANTABLE DEVICE | Site: KNEE | Status: NON-FUNCTIONAL
Removed: 2020-12-31

## (undated) DEVICE — SUT VIC 2/0 CT1 CR8 18IN J839D

## (undated) DEVICE — MEDI-VAC YANK SUCT HNDL W/TPRD BULBOUS TIP: Brand: CARDINAL HEALTH